# Patient Record
Sex: FEMALE | Race: OTHER | Employment: UNEMPLOYED | URBAN - METROPOLITAN AREA
[De-identification: names, ages, dates, MRNs, and addresses within clinical notes are randomized per-mention and may not be internally consistent; named-entity substitution may affect disease eponyms.]

---
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
## 2020-09-10 ENCOUNTER — OFFICE VISIT (OUTPATIENT)
Dept: PRIMARY CARE CLINIC | Age: 20
End: 2020-09-10
Payer: MEDICAID

## 2020-09-10 VITALS
WEIGHT: 179.8 LBS | HEIGHT: 67 IN | SYSTOLIC BLOOD PRESSURE: 128 MMHG | TEMPERATURE: 98.4 F | HEART RATE: 90 BPM | RESPIRATION RATE: 18 BRPM | DIASTOLIC BLOOD PRESSURE: 84 MMHG | OXYGEN SATURATION: 98 % | BODY MASS INDEX: 28.22 KG/M2

## 2020-09-10 DIAGNOSIS — Z00.00 WELL ADULT ON ROUTINE HEALTH CHECK: ICD-10-CM

## 2020-09-10 DIAGNOSIS — Z00.00 WELL ADULT ON ROUTINE HEALTH CHECK: Primary | ICD-10-CM

## 2020-09-10 DIAGNOSIS — E66.3 OVERWEIGHT: ICD-10-CM

## 2020-09-10 LAB
BASOPHILS # BLD: 0 K/UL (ref 0–0.1)
BASOPHILS NFR BLD: 1 % (ref 0–1)
DIFFERENTIAL METHOD BLD: NORMAL
EOSINOPHIL # BLD: 0.1 K/UL (ref 0–0.4)
EOSINOPHIL NFR BLD: 1 % (ref 0–7)
ERYTHROCYTE [DISTWIDTH] IN BLOOD BY AUTOMATED COUNT: 13.2 % (ref 11.5–14.5)
HCT VFR BLD AUTO: 36.4 % (ref 35–47)
HGB BLD-MCNC: 11.5 G/DL (ref 11.5–16)
IMM GRANULOCYTES # BLD AUTO: 0 K/UL (ref 0–0.04)
IMM GRANULOCYTES NFR BLD AUTO: 0 % (ref 0–0.5)
LYMPHOCYTES # BLD: 1.9 K/UL (ref 0.8–3.5)
LYMPHOCYTES NFR BLD: 29 % (ref 12–49)
MCH RBC QN AUTO: 28.2 PG (ref 26–34)
MCHC RBC AUTO-ENTMCNC: 31.6 G/DL (ref 30–36.5)
MCV RBC AUTO: 89.2 FL (ref 80–99)
MONOCYTES # BLD: 0.6 K/UL (ref 0–1)
MONOCYTES NFR BLD: 9 % (ref 5–13)
NEUTS SEG # BLD: 4 K/UL (ref 1.8–8)
NEUTS SEG NFR BLD: 60 % (ref 32–75)
NRBC # BLD: 0 K/UL (ref 0–0.01)
NRBC BLD-RTO: 0 PER 100 WBC
PLATELET # BLD AUTO: 274 K/UL (ref 150–400)
PMV BLD AUTO: 12.4 FL (ref 8.9–12.9)
RBC # BLD AUTO: 4.08 M/UL (ref 3.8–5.2)
WBC # BLD AUTO: 6.7 K/UL (ref 3.6–11)

## 2020-09-10 PROCEDURE — 99395 PREV VISIT EST AGE 18-39: CPT | Performed by: FAMILY MEDICINE

## 2020-09-10 NOTE — PATIENT INSTRUCTIONS
Well Visit, Ages 25 to 48: Care Instructions  Your Care Instructions     Physical exams can help you stay healthy. Your doctor has checked your overall health and may have suggested ways to take good care of yourself. He or she also may have recommended tests. At home, you can help prevent illness with healthy eating, regular exercise, and other steps. Follow-up care is a key part of your treatment and safety. Be sure to make and go to all appointments, and call your doctor if you are having problems. It's also a good idea to know your test results and keep a list of the medicines you take. How can you care for yourself at home? · Reach and stay at a healthy weight. This will lower your risk for many problems, such as obesity, diabetes, heart disease, and high blood pressure. · Get at least 30 minutes of physical activity on most days of the week. Walking is a good choice. You also may want to do other activities, such as running, swimming, cycling, or playing tennis or team sports. Discuss any changes in your exercise program with your doctor. · Do not smoke or allow others to smoke around you. If you need help quitting, talk to your doctor about stop-smoking programs and medicines. These can increase your chances of quitting for good. · Talk to your doctor about whether you have any risk factors for sexually transmitted infections (STIs). Having one sex partner (who does not have STIs and does not have sex with anyone else) is a good way to avoid these infections. · Use birth control if you do not want to have children at this time. Talk with your doctor about the choices available and what might be best for you. · Protect your skin from too much sun. When you're outdoors from 10 a.m. to 4 p.m., stay in the shade or cover up with clothing and a hat with a wide brim. Wear sunglasses that block UV rays. Even when it's cloudy, put broad-spectrum sunscreen (SPF 30 or higher) on any exposed skin.   · See a dentist one or two times a year for checkups and to have your teeth cleaned. · Wear a seat belt in the car. Follow your doctor's advice about when to have certain tests. These tests can spot problems early. For everyone  · Cholesterol. Have the fat (cholesterol) in your blood tested after age 21. Your doctor will tell you how often to have this done based on your age, family history, or other things that can increase your risk for heart disease. · Blood pressure. Have your blood pressure checked during a routine doctor visit. Your doctor will tell you how often to check your blood pressure based on your age, your blood pressure results, and other factors. · Vision. Talk with your doctor about how often to have a glaucoma test.  · Diabetes. Ask your doctor whether you should have tests for diabetes. · Colon cancer. Your risk for colorectal cancer gets higher as you get older. Some experts say that adults should start regular screening at age 48 and stop at age 76. Others say to start before age 48 or continue after age 76. Talk with your doctor about your risk and when to start and stop screening. For women  · Breast exam and mammogram. Talk to your doctor about when you should have a clinical breast exam and a mammogram. Medical experts differ on whether and how often women under 50 should have these tests. Your doctor can help you decide what is right for you. · Cervical cancer screening test and pelvic exam. Begin with a Pap test at age 24. The test often is part of a pelvic exam. Starting at age 27, you may choose to have a Pap test, an HPV test, or both tests at the same time (called co-testing). Talk with your doctor about how often to have testing. · Tests for sexually transmitted infections (STIs). Ask whether you should have tests for STIs. You may be at risk if you have sex with more than one person, especially if your partners do not wear condoms.   For men  · Tests for sexually transmitted infections (STIs). Ask whether you should have tests for STIs. You may be at risk if you have sex with more than one person, especially if you do not wear a condom. · Testicular cancer exam. Ask your doctor whether you should check your testicles regularly. · Prostate exam. Talk to your doctor about whether you should have a blood test (called a PSA test) for prostate cancer. Experts differ on whether and when men should have this test. Some experts suggest it if you are older than 39 and are -American or have a father or brother who got prostate cancer when he was younger than 72. When should you call for help? Watch closely for changes in your health, and be sure to contact your doctor if you have any problems or symptoms that concern you. Where can you learn more? Go to http://digna-chantal.info/  Enter P072 in the search box to learn more about \"Well Visit, Ages 25 to 48: Care Instructions. \"  Current as of: May 27, 2020               Content Version: 12.6  © 2006-2020 [employer]. Care instructions adapted under license by [employer] (which disclaims liability or warranty for this information). If you have questions about a medical condition or this instruction, always ask your healthcare professional. [place] [address] any warranty or liability for your use of this information.

## 2020-09-10 NOTE — PROGRESS NOTES
Subjective:   21 y.o. female for Well Woman Check. Her gyne and breast care is done elsewhere by her Ob-Gyne physician. She was following up with Dr. Barbi Oquendo for irregular period but due to insurance she was not able to follow up or started the OCP. She has a plan to follow up as soon as she has insurance which she applied recently. Now she has been having period about every 2 months. She exercise regularly but admits that her diet needs to be improved. She does feel anxious sometimes but no depression. Patient Active Problem List   Diagnosis Code    Overweight (BMI 25.0-29. 9) [id number]     Current Outpatient Medications   Medication Sig Dispense Refill    ibuprofen (ADVIL) 200 mg tablet Take 200 mg by mouth every six (6) hours as needed for Pain. No Known Allergies  History reviewed. No pertinent past medical history. Lab Results   Component Value Date/Time    WBC 3.6 06/27/2019 08:34 AM    HGB 12.0 06/27/2019 08:34 AM    HCT 37.4 06/27/2019 08:34 AM    PLATELET 099 43/70/2717 08:34 AM    MCV 86 06/27/2019 08:34 AM     No results found for: CHOL, CHOLPOCT, HDL, LDL, LDLC, LDLCPOC, LDLCEXT, TRIGL, TGLPOCT, CHHD, CHHDX  Lab Results   Component Value Date/Time    ALT (SGPT) 25 08/23/2019 02:15 PM    Alk.  phosphatase 70 08/23/2019 02:15 PM    Bilirubin, direct 0.07 08/23/2019 02:15 PM    Bilirubin, total <0.2 08/23/2019 02:15 PM    Albumin 4.6 08/23/2019 02:15 PM    Protein, total 7.5 08/23/2019 02:15 PM    PLATELET 693 22/96/7775 08:34 AM     Lab Results   Component Value Date/Time    GFR est non- 06/27/2019 08:34 AM    GFR est  06/27/2019 08:34 AM    Creatinine 0.76 06/27/2019 08:34 AM    BUN 19 06/27/2019 08:34 AM    Sodium 143 06/27/2019 08:34 AM    Potassium 4.7 06/27/2019 08:34 AM    Chloride 105 06/27/2019 08:34 AM    CO2 24 06/27/2019 08:34 AM     Lab Results   Component Value Date/Time    TSH 3.140 10/21/2019 03:02 PM      No results found for: HBA1C, OJF9PUBO, HGBE8, HAK8WWCT, VLP3VFGZ      ROS: Feeling generally well. No TIA's or unusual headaches, no dysphagia. No prolonged cough. No dyspnea or chest pain on exertion. No abdominal pain, change in bowel habits, black or bloody stools. No urinary tract symptoms. No new or unusual musculoskeletal symptoms. Specific concerns today: she would like to check hemoglobin level, blood sugar level as well as all other labs like kidney, liver, cholesterol. She is fasting for her blood work. Objective: The patient appears well, alert, oriented x 3, in no distress. Visit Vitals  /84   Pulse 90   Temp 98.4 °F (36.9 °C) (Temporal)   Resp 18   Ht 5' 7\" (1.702 m)   Wt 179 lb 12.8 oz (81.6 kg)   LMP 09/10/2020   SpO2 98%   BMI 28.16 kg/m²     ENT normal.  Neck supple. No adenopathy or thyromegaly. SILVINO. Lungs are clear, good air entry, no wheezes, rhonchi or rales. S1 and S2 normal, no murmurs, regular rate and rhythm. Abdomen soft without tenderness, guarding, mass or organomegaly. Extremities show no edema, normal peripheral pulses. Neurological is normal, no focal findings. Breast and Pelvic exams are deferred. Assessment/Plan:   Well Woman  lose weight, increase physical activity, follow low fat diet, follow low salt diet, continue present plan, routine labs ordered, call if any problems    ICD-10-CM ICD-9-CM    1. Well adult on routine health check  Z00.00 V70.0 CBC WITH AUTOMATED DIFF      HEMOGLOBIN A1C WITH EAG      METABOLIC PANEL, COMPREHENSIVE      LIPID PANEL      THYROID CASCADE PROFILE   2. Overweight  E66.3 278.02      Diagnoses and all orders for this visit:    1. Well adult on routine health check  -     CBC WITH AUTOMATED DIFF; Future  -     HEMOGLOBIN A1C WITH EAG; Future  -     METABOLIC PANEL, COMPREHENSIVE; Future  -     LIPID PANEL; Future  -     THYROID CASCADE PROFILE; Future              Follow up with us if she need any assistance on her period issues.    2. Overweight      Counseled on diet and exercise. Advised to obtain flu vaccination. She is not sure about that yet but she will consider. Follow-up and Dispositions    · Return if symptoms worsen or fail to improve.        current treatment plan is effective, no change in therapy  reviewed diet, exercise and weight control

## 2020-09-10 NOTE — PROGRESS NOTES
Chief Complaint   Patient presents with    Follow-up       1. Have you been to the ER, urgent care clinic since your last visit? Hospitalized since your last visit? no    2. Have you seen or consulted any other health care providers outside of the [address] [place] [address] since your last visit? Include any pap smears or colon screening.   no

## 2020-09-11 LAB
ALBUMIN SERPL-MCNC: 4 G/DL (ref 3.5–5)
ALBUMIN/GLOB SERPL: 1.1 {RATIO} (ref 1.1–2.2)
ALP SERPL-CCNC: 73 U/L (ref 45–117)
ALT SERPL-CCNC: 39 U/L (ref 12–78)
ANION GAP SERPL CALC-SCNC: 6 MMOL/L (ref 5–15)
AST SERPL-CCNC: 17 U/L (ref 15–37)
BILIRUB SERPL-MCNC: 0.3 MG/DL (ref 0.2–1)
BUN SERPL-MCNC: 16 MG/DL (ref 6–20)
BUN/CREAT SERPL: 23 (ref 12–20)
CALCIUM SERPL-MCNC: 9.4 MG/DL (ref 8.5–10.1)
CHLORIDE SERPL-SCNC: 106 MMOL/L (ref 97–108)
CHOLEST SERPL-MCNC: 149 MG/DL
CO2 SERPL-SCNC: 26 MMOL/L (ref 21–32)
CREAT SERPL-MCNC: 0.71 MG/DL (ref 0.55–1.02)
EST. AVERAGE GLUCOSE BLD GHB EST-MCNC: 97 MG/DL
GLOBULIN SER CALC-MCNC: 3.6 G/DL (ref 2–4)
GLUCOSE SERPL-MCNC: 89 MG/DL (ref 65–100)
HBA1C MFR BLD: 5 % (ref 4–5.6)
HDLC SERPL-MCNC: 47 MG/DL
HDLC SERPL: 3.2 {RATIO} (ref 0–5)
LDLC SERPL CALC-MCNC: 84.4 MG/DL (ref 0–100)
LIPID PROFILE,FLP: NORMAL
POTASSIUM SERPL-SCNC: 3.9 MMOL/L (ref 3.5–5.1)
PROT SERPL-MCNC: 7.6 G/DL (ref 6.4–8.2)
SODIUM SERPL-SCNC: 138 MMOL/L (ref 136–145)
TRIGL SERPL-MCNC: 88 MG/DL (ref ?–150)
TSH SERPL-ACNC: 3.05 UIU/ML (ref 0.45–4.5)
VLDLC SERPL CALC-MCNC: 17.6 MG/DL

## 2020-09-11 NOTE — PROGRESS NOTES
Sent via my chart. June Hans! Your lab results came back good! Nothing concerning. Blood sugar level, thyroid level is in normal range. Thanks.     Dr. Rafe Hashimoto

## 2021-03-10 NOTE — PROGRESS NOTES
HPI:     Chief Complaint   Patient presents with    Immunization/Injection     Not here to [facility], only needs flu shot        Patient is a 25 y.o. female who presents as new patient for flu shot. She has no significant medical history and takes not chronic medications. Patient does report history of low back issues and states she has a small herniated disc. She is seeing ortho for this and is in PT. She takes naproxen as needed. She is not in any pain today. Denies any LE weakness, numbness, paresthesias, loss of bowel/bladder control, saddle anesthesia. Patient feels well today and has no concerns. She is here only for flu shot. Patient reports she had physical in August 2018 with her PCP. Patient denies fever, chills, dizziness, headache, fatigue, syncope, chest pain, palpitations, dyspnea, abdominal pain, change in appetite, nausea, vomiting, constipation, and diarrhea. Patient Active Problem List   Diagnosis Code    Overweight (BMI 25.0-29. 9) E66.3     Current Outpatient Medications   Medication Sig Dispense Refill    naproxen (NAPROSYN) 375 mg tablet Take 375 mg by mouth two (2) times daily (with meals). No Known Allergies     History reviewed. No pertinent past medical history. Past Surgical History:   Procedure Laterality Date    HX WISDOM TEETH EXTRACTION       Family History   Problem Relation Age of Onset    No Known Problems Mother     Hypertension Father     Diabetes Maternal Grandmother     Hypertension Maternal Grandmother     Cancer Maternal Grandfather         liver cancer    Cancer Paternal Grandmother     Heart Attack Paternal Grandfather     Heart Disease Paternal Grandfather      Social History     Tobacco Use    Smoking status: Never Smoker    Smokeless tobacco: Never Used   Substance Use Topics    Alcohol use: No     Frequency: Never          ROS:   Pertinent items are noted in HPI.       Objective:     Vitals:    11/23/18 1024   BP: 120/77   Pulse: 77 Resp: 16   Temp: 98.5 °F (36.9 °C)   TempSrc: Oral   SpO2: 99%   Weight: 165 lb 9.6 oz (75.1 kg)   Height: 5' 7\" (1.702 m)        Vitals and Nurse Documentation reviewed. Physical Examination:   General appearance - alert, well appearing, and in no distress  Mental status - alert, oriented to person, place, and time, normal mood, behavior, speech, dress, motor activity, and thought processes  Eyes - pupils equal and reactive, extraocular eye movements intact, sclera white, conjunctiva pink  Ears - bilateral TM's and external ear canals normal  Nose - normal and patent, no erythema, discharge or polyps  Mouth - mucous membranes moist, pharynx normal without lesions  Neck - supple, no significant adenopathy  Chest - clear to auscultation, no wheezes, rales or rhonchi, symmetric air entry  Heart - normal rate, regular rhythm, normal S1, S2, no murmurs, rubs, clicks or gallops  Abdomen - soft, nontender, nondistended, no masses or organomegaly  Neurological - alert, oriented, normal speech, no focal findings or movement disorder noted  Extremities - peripheral pulses normal, no pedal edema, no clubbing or cyanosis      Assessment/ Plan:   Diagnoses and all orders for this visit:    1. Encounter for immunization  -     Patient seen today for flu shot. No contraindications to receiving flu shot today. -     INFLUENZA VIRUS VAC QUAD,SPLIT,PRESV FREE SYRINGE IM    2. Overweight (BMI 25.0-29.9)        -     BMI discussed with patient. Discussed lifestyle changes, daily physical activity, and advised 150 minutes of exercise weekly. Discussed healthy diet choices and limiting fried, fatty foods, fast foods, processed foods, sugar-sweetened beverages/soda, and added sugars. Increase fruits, vegetables, low-fat dairy products, lean proteins, and whole grains. Patient expressed interest in the HPV vaccine during today's visit. She does not want to start series today, but would like more information.   The HPV virus was discussed and the vaccine was highly recommended. Patient was given educational handout to read more about vaccine. Patient was told that she can follow-up with her PCP for vaccine or come back here to start the series/establish care if she likes. Follow-up Disposition:  Return if symptoms worsen or fail to improve. I have discussed the diagnosis with the patient and the intended plan as seen in the above orders. Advised prompt follow-up if symptoms worsen or fail to improve and symptoms that would warrant emergent evaluation in ED. The patient has received an after-visit summary and questions were answered concerning future plans. I have discussed medication side effects and warnings with the patient as well. Patient expressed understanding and is in agreement with the diagnosis and plan. Transposition Flap Text: The defect edges were debeveled with a #15 scalpel blade.  Given the location of the defect and the proximity to free margins a transposition flap was deemed most appropriate.  Using a sterile surgical marker, an appropriate transposition flap was drawn incorporating the defect.    The area thus outlined was incised deep to adipose tissue with a #15 scalpel blade.  The skin margins were undermined to an appropriate distance in all directions utilizing iris scissors.

## 2021-10-05 ENCOUNTER — OFFICE VISIT (OUTPATIENT)
Dept: PRIMARY CARE CLINIC | Age: 21
End: 2021-10-05
Payer: COMMERCIAL

## 2021-10-05 VITALS
WEIGHT: 156 LBS | OXYGEN SATURATION: 100 % | TEMPERATURE: 98.2 F | HEIGHT: 67 IN | BODY MASS INDEX: 24.48 KG/M2 | HEART RATE: 70 BPM | RESPIRATION RATE: 16 BRPM | SYSTOLIC BLOOD PRESSURE: 119 MMHG | DIASTOLIC BLOOD PRESSURE: 76 MMHG

## 2021-10-05 DIAGNOSIS — F41.9 ANXIETY: ICD-10-CM

## 2021-10-05 DIAGNOSIS — Z00.00 WELL ADULT ON ROUTINE HEALTH CHECK: Primary | ICD-10-CM

## 2021-10-05 PROCEDURE — 99395 PREV VISIT EST AGE 18-39: CPT | Performed by: FAMILY MEDICINE

## 2021-10-05 PROCEDURE — 99212 OFFICE O/P EST SF 10 MIN: CPT | Performed by: FAMILY MEDICINE

## 2021-10-05 RX ORDER — SERTRALINE HYDROCHLORIDE 50 MG/1
TABLET, FILM COATED ORAL
Qty: 30 TABLET | Refills: 1 | Status: SHIPPED | OUTPATIENT
Start: 2021-10-05 | End: 2022-02-09

## 2021-10-05 NOTE — PROGRESS NOTES
Subjective:   24 y.o. female is here with a request to get blood work and physical. She is here to make sure hemoglobin level is normal.     Depression screen is positive today. Denies any SI, HI. She reports that she does not think she is depressed but she feels anxious all the time. Reports that she has seen counselor in the past. She think its time to start medication for anxiety. Her gyne and breast care is done elsewhere by her Ob-Gyne physician. Patient Active Problem List   Diagnosis Code    Overweight (BMI 25.0-29. 9) E66.3     Current Outpatient Medications   Medication Sig Dispense Refill    sertraline (ZOLOFT) 50 mg tablet Take 1/2 tab daily for 2 weeks followed by one tab PO daily afterwards. 30 Tablet 1    ibuprofen (ADVIL) 200 mg tablet Take 200 mg by mouth every six (6) hours as needed for Pain. No Known Allergies  History reviewed. No pertinent past medical history. Past Surgical History:   Procedure Laterality Date    HX WISDOM TEETH EXTRACTION          Lab Results   Component Value Date/Time    WBC 6.7 09/10/2020 10:06 AM    HGB 11.5 09/10/2020 10:06 AM    HCT 36.4 09/10/2020 10:06 AM    PLATELET 975 72/29/4850 10:06 AM    MCV 89.2 09/10/2020 10:06 AM     Lab Results   Component Value Date/Time    Cholesterol, total 149 09/10/2020 10:06 AM    HDL Cholesterol 47 09/10/2020 10:06 AM    LDL, calculated 84.4 09/10/2020 10:06 AM    Triglyceride 88 09/10/2020 10:06 AM    CHOL/HDL Ratio 3.2 09/10/2020 10:06 AM     Lab Results   Component Value Date/Time    GFR est non-AA >60 09/10/2020 10:06 AM    GFR est AA >60 09/10/2020 10:06 AM    Creatinine 0.71 09/10/2020 10:06 AM    BUN 16 09/10/2020 10:06 AM    Sodium 138 09/10/2020 10:06 AM    Potassium 3.9 09/10/2020 10:06 AM    Chloride 106 09/10/2020 10:06 AM    CO2 26 09/10/2020 10:06 AM     Lab Results   Component Value Date/Time    TSH 3.050 09/10/2020 10:06 AM    TSH 3.140 10/21/2019 03:02 PM         ROS: Feeling generally well.  No TIA's or unusual headaches, no dysphagia. No prolonged cough. No dyspnea or chest pain on exertion. No abdominal pain, change in bowel habits, black or bloody stools. No urinary tract symptoms. No new or unusual musculoskeletal symptoms. Specific concerns today: refer to HPI. .    Objective: The patient appears well, alert, oriented x 3, in no distress. Visit Vitals  /76   Pulse 70   Temp 98.2 °F (36.8 °C) (Oral)   Resp 16   Ht 5' 7\" (1.702 m)   Wt 156 lb (70.8 kg)   SpO2 100%   BMI 24.43 kg/m²     ENT normal.  Neck supple. No adenopathy or thyromegaly. SILVINO. Lungs are clear, good air entry, no wheezes, rhonchi or rales. S1 and S2 normal, no murmurs, regular rate and rhythm. Abdomen soft without tenderness, guarding, mass or organomegaly. Extremities show no edema, normal peripheral pulses. Neurological is normal, no focal findings. Breast and Pelvic exams are deferred. Assessment/Plan:   Well Woman  follow low fat diet, follow low salt diet, continue present plan, routine labs ordered, call if any problems    ICD-10-CM ICD-9-CM    1. Well adult on routine health check  R57.53 U62.5 METABOLIC PANEL, COMPREHENSIVE      LIPID PANEL      CBC WITH AUTOMATED DIFF      THYROID CASCADE PROFILE   2. Anxiety  F41.9 300.00 sertraline (ZOLOFT) 50 mg tablet      THYROID CASCADE PROFILE     Diagnoses and all orders for this visit:    1. Well adult on routine health check  -     METABOLIC PANEL, COMPREHENSIVE; Future  -     LIPID PANEL; Future  -     CBC WITH AUTOMATED DIFF; Future  -     THYROID CASCADE PROFILE; Future    2. Anxiety  -    After discussing different options agreed to try sertraline (ZOLOFT) 50 mg tablet; Take 1/2 tab daily for 2 weeks followed by one tab PO daily afterwards.  -     THYROID CASCADE PROFILE; Future      Follow-up and Dispositions    · Return in about 2 months (around 12/5/2021), or if symptoms worsen or fail to improve, for anxiety.        reviewed diet, exercise and weight control  reviewed medications and side effects in detail.

## 2021-10-05 NOTE — PROGRESS NOTES
Nury Thomas is a 24 y.o. female  Chief Complaint   Patient presents with    Follow-up     Health Maintenance Due   Topic Date Due    Hepatitis C Screening  Never done    COVID-19 Vaccine (1) Never done    Pap Smear  Never done    Flu Vaccine (1) 09/01/2021     Visit Vitals  /76   Pulse 70   Temp 98.2 °F (36.8 °C) (Oral)   Resp 16   Ht 5' 7\" (1.702 m)   Wt 156 lb (70.8 kg)   SpO2 100%   BMI 24.43 kg/m²

## 2021-10-19 ENCOUNTER — OFFICE VISIT (OUTPATIENT)
Dept: PRIMARY CARE CLINIC | Age: 21
End: 2021-10-19
Payer: COMMERCIAL

## 2021-10-19 VITALS
WEIGHT: 158 LBS | BODY MASS INDEX: 24.8 KG/M2 | HEART RATE: 84 BPM | DIASTOLIC BLOOD PRESSURE: 79 MMHG | OXYGEN SATURATION: 98 % | HEIGHT: 67 IN | RESPIRATION RATE: 16 BRPM | TEMPERATURE: 98.4 F | SYSTOLIC BLOOD PRESSURE: 118 MMHG

## 2021-10-19 DIAGNOSIS — Z23 ENCOUNTER FOR IMMUNIZATION: ICD-10-CM

## 2021-10-19 DIAGNOSIS — D64.9 LOW HEMOGLOBIN: Primary | ICD-10-CM

## 2021-10-19 PROCEDURE — 90686 IIV4 VACC NO PRSV 0.5 ML IM: CPT | Performed by: FAMILY MEDICINE

## 2021-10-19 PROCEDURE — 99213 OFFICE O/P EST LOW 20 MIN: CPT | Performed by: FAMILY MEDICINE

## 2021-10-19 RX ORDER — FERROUS SULFATE 325(65) MG
325 TABLET, DELAYED RELEASE (ENTERIC COATED) ORAL 2 TIMES DAILY WITH MEALS
Qty: 60 TABLET | Refills: 3 | Status: SHIPPED | OUTPATIENT
Start: 2021-10-19

## 2021-10-19 NOTE — PROGRESS NOTES
Identified pt with two pt identifiers(name and ). No chief complaint on file. 3 most recent PHQ Screens 10/5/2021   Little interest or pleasure in doing things More than half the days   Feeling down, depressed, irritable, or hopeless More than half the days   Total Score PHQ 2 4   Trouble falling or staying asleep, or sleeping too much Not at all   Feeling tired or having little energy More than half the days   Poor appetite, weight loss, or overeating Not at all   Feeling bad about yourself - or that you are a failure or have let yourself or your family down Nearly every day   Trouble concentrating on things such as school, work, reading, or watching TV Nearly every day   Moving or speaking so slowly that other people could have noticed; or the opposite being so fidgety that others notice Not at all   Thoughts of being better off dead, or hurting yourself in some way Not at all   PHQ 9 Score 12   How difficult have these problems made it for you to do your work, take care of your home and get along with others Somewhat difficult        There were no vitals filed for this visit. Health Maintenance Due   Topic    Hepatitis C Screening     COVID-19 Vaccine (1)    Pap Smear        1. Have you been to the ER, urgent care clinic since your last visit? Hospitalized since your last visit?no    2. Have you seen or consulted any other health care providers outside of the [address] [place] [address] since your last visit? Include any pap smears or colon screening.  No

## 2021-10-19 NOTE — PROGRESS NOTES
Subjective:     Chief Complaint   Patient presents with    Results        She  is a 24y.o. year old female who presents today to discuss lab results. Her recent lab showed low hemoglobin otherwise lab looks fine. No hx of low Hgb. Sometimes she feels dizzy and HA otherwise doing well. About two weeks ago she was started on Zoloft for anxiety and depression. She reports that 25 mg of Zoloft is working well for her. She would like to continue 25 mg. Lab Results   Component Value Date/Time    WBC 6.0 10/07/2021 07:15 AM    HGB 10.3 (L) 10/07/2021 07:15 AM    HCT 33.7 (L) 10/07/2021 07:15 AM    PLATELET 203 86/88/3399 07:15 AM    MCV 89.9 10/07/2021 07:15 AM     Lab Results   Component Value Date/Time    Cholesterol, total 150 10/07/2021 07:15 AM    HDL Cholesterol 58 10/07/2021 07:15 AM    LDL, calculated 76 10/07/2021 07:15 AM    Triglyceride 80 10/07/2021 07:15 AM    CHOL/HDL Ratio 2.6 10/07/2021 07:15 AM     Lab Results   Component Value Date/Time    ALT (SGPT) 23 10/07/2021 07:15 AM    Alk. phosphatase 80 10/07/2021 07:15 AM    Bilirubin, direct 0.07 08/23/2019 02:15 PM    Bilirubin, total 0.4 10/07/2021 07:15 AM    Albumin 3.7 10/07/2021 07:15 AM    Protein, total 7.1 10/07/2021 07:15 AM    PLATELET 733 26/15/8492 07:15 AM     Lab Results   Component Value Date/Time    GFR est non-AA >60 10/07/2021 07:15 AM    GFR est AA >60 10/07/2021 07:15 AM    Creatinine 0.78 10/07/2021 07:15 AM    BUN 19 10/07/2021 07:15 AM    Sodium 140 10/07/2021 07:15 AM    Potassium 4.7 10/07/2021 07:15 AM    Chloride 109 (H) 10/07/2021 07:15 AM    CO2 26 10/07/2021 07:15 AM     Lab Results   Component Value Date/Time    TSH 1.970 10/07/2021 07:15 AM    TSH 3.140 10/21/2019 03:02 PM          Pertinent items are noted in HPI.   Objective:     Vitals:    10/19/21 1556   BP: 118/79   Pulse: 84   Resp: 16   Temp: 98.4 °F (36.9 °C)   TempSrc: Temporal   SpO2: 98%   Weight: 158 lb (71.7 kg)   Height: 5' 7\" (1.702 m)       Physical Examination: General appearance - alert, well appearing, and in no distress, oriented to person, place, and time and normal appearing weight  Mental status - alert, oriented to person, place, and time, normal mood, behavior, speech, dress, motor activity, and thought processes  Chest - clear to auscultation, no wheezes, rales or rhonchi, symmetric air entry  Heart - normal rate, regular rhythm, normal S1, S2, no murmurs, rubs, clicks or gallops    No Known Allergies   Social History     Socioeconomic History    Marital status: SINGLE     Spouse name: Not on file    Number of children: Not on file    Years of education: Not on file    Highest education level: Not on file   Tobacco Use    Smoking status: Never Smoker    Smokeless tobacco: Never Used   Vaping Use    Vaping Use: Never used   Substance and Sexual Activity    Alcohol use: No    Drug use: No    Sexual activity: Never   Social History Narrative    ** Merged History Encounter **          Social Determinants of Health     Financial Resource Strain:     Difficulty of Paying Living Expenses:    Food Insecurity:     Worried About Running Out of Food in the Last Year:     Ran Out of Food in the Last Year:    Transportation Needs:     Lack of Transportation (Medical):      Lack of Transportation (Non-Medical):    Physical Activity:     Days of Exercise per Week:     Minutes of Exercise per Session:    Stress:     Feeling of Stress :    Social Connections:     Frequency of Communication with Friends and Family:     Frequency of Social Gatherings with Friends and Family:     Attends Restoration Services:     Active Member of Clubs or Organizations:     Attends Club or Organization Meetings:     Marital Status:       Family History   Problem Relation Age of Onset    No Known Problems Mother     Hypertension Father     Elevated Lipids Father     Diabetes Maternal Grandmother     Hypertension Maternal Grandmother     Cancer Maternal Grandfather         liver cancer    Cancer Paternal Grandmother         unknown    Heart Attack Paternal Grandfather     Heart Disease Paternal Grandfather     Breast Cancer Paternal Aunt       Past Surgical History:   Procedure Laterality Date    HX WISDOM TEETH EXTRACTION        History reviewed. No pertinent past medical history. Current Outpatient Medications   Medication Sig Dispense Refill    sertraline (ZOLOFT) 50 mg tablet Take 1/2 tab daily for 2 weeks followed by one tab PO daily afterwards. 30 Tablet 1    ibuprofen (ADVIL) 200 mg tablet Take 200 mg by mouth every six (6) hours as needed for Pain. Assessment/ Plan:   Diagnoses and all orders for this visit:    1. Low hemoglobin  -    Start  ferrous sulfate (IRON) 325 mg (65 mg iron) EC tablet; Take 1 Tablet by mouth two (2) times daily (with meals). Encouraged to increase iron rich diet. 2. Encounter for immunization  -     INFLUENZA VIRUS VAC QUAD,SPLIT,PRESV FREE SYRINGE IM           Medication risks/benefits/costs/interactions/alternatives discussed with patient. Advised patient to call back or return to office if symptoms worsen/change/persist. If patient cannot reach us or should anything more severe/urgent arise he/she should proceed directly to the nearest emergency department. Discussed expected course/resolution/complications of diagnosis in detail with patient. Patient given a written after visit summary which includes her diagnoses, current medications and vitals. Patient expressed understanding with the diagnosis and plan. Follow-up and Dispositions    · Return in about 2 months (around 12/19/2021) for anxiety.

## 2022-01-05 NOTE — PATIENT INSTRUCTIONS
Well Visit, Ages 25 to 48: Care Instructions  Overview     Well visits can help you stay healthy. Your doctor has checked your overall health and may have suggested ways to take good care of yourself. Your doctor also may have recommended tests. At home, you can help prevent illness with healthy eating, regular exercise, and other steps. Follow-up care is a key part of your treatment and safety. Be sure to make and go to all appointments, and call your doctor if you are having problems. It's also a good idea to know your test results and keep a list of the medicines you take. How can you care for yourself at home? · Get screening tests that you and your doctor decide on. Screening helps find diseases before any symptoms appear. · Eat healthy foods. Choose fruits, vegetables, whole grains, protein, and low-fat dairy foods. Limit fat, especially saturated fat. Reduce salt in your diet. · Limit alcohol. If you are a man, have no more than 2 drinks a day or 14 drinks a week. If you are a woman, have no more than 1 drink a day or 7 drinks a week. · Get at least 30 minutes of physical activity on most days of the week. Walking is a good choice. You also may want to do other activities, such as running, swimming, cycling, or playing tennis or team sports. Discuss any changes in your exercise program with your doctor. · Reach and stay at a healthy weight. This will lower your risk for many problems, such as obesity, diabetes, heart disease, and high blood pressure. · Do not smoke or allow others to smoke around you. If you need help quitting, talk to your doctor about stop-smoking programs and medicines. These can increase your chances of quitting for good. · Care for your mental health. It is easy to get weighed down by worry and stress. Learn strategies to manage stress, like deep breathing and mindfulness, and stay connected with your family and community.  If you find you often feel sad or hopeless, talk with your doctor. Treatment can help. · Talk to your doctor about whether you have any risk factors for sexually transmitted infections (STIs). You can help prevent STIs if you wait to have sex with a new partner (or partners) until you've each been tested for STIs. It also helps if you use condoms (male or female condoms) and if you limit your sex partners to one person who only has sex with you. Vaccines are available for some STIs, such as HPV. · Use birth control if it's important to you to prevent pregnancy. Talk with your doctor about the choices available and what might be best for you. · If you think you may have a problem with alcohol or drug use, talk to your doctor. This includes prescription medicines (such as amphetamines and opioids) and illegal drugs (such as cocaine and methamphetamine). Your doctor can help you figure out what type of treatment is best for you. · Protect your skin from too much sun. When you're outdoors from 10 a.m. to 4 p.m., stay in the shade or cover up with clothing and a hat with a wide brim. Wear sunglasses that block UV rays. Even when it's cloudy, put broad-spectrum sunscreen (SPF 30 or higher) on any exposed skin. · See a dentist one or two times a year for checkups and to have your teeth cleaned. · Wear a seat belt in the car. When should you call for help? Watch closely for changes in your health, and be sure to contact your doctor if you have any problems or symptoms that concern you. Where can you learn more? Go to http://www.[employer].com/  Enter P072 in the search box to learn more about \"Well Visit, Ages 25 to 48: Care Instructions. \"  Current as of: February 11, 2021               Content Version: 13.0  © 5992-1586 [employer]. Care instructions adapted under license by [employer] (which disclaims liability or warranty for this information).  If you have questions about a medical condition or this instruction, always ask your healthcare professional. [place] [address] any warranty or liability for your use of this information.

## 2022-01-06 ENCOUNTER — OFFICE VISIT (OUTPATIENT)
Dept: OBGYN CLINIC | Age: 22
End: 2022-01-06
Payer: COMMERCIAL

## 2022-01-06 VITALS
WEIGHT: 154 LBS | BODY MASS INDEX: 24.17 KG/M2 | HEIGHT: 67 IN | SYSTOLIC BLOOD PRESSURE: 130 MMHG | DIASTOLIC BLOOD PRESSURE: 82 MMHG

## 2022-01-06 DIAGNOSIS — N92.6 IRREGULAR MENSES: ICD-10-CM

## 2022-01-06 DIAGNOSIS — Z01.419 ENCOUNTER FOR GYNECOLOGICAL EXAMINATION WITHOUT ABNORMAL FINDING: Primary | ICD-10-CM

## 2022-01-06 PROCEDURE — 99385 PREV VISIT NEW AGE 18-39: CPT | Performed by: OBSTETRICS & GYNECOLOGY

## 2022-01-06 RX ORDER — NORETHINDRONE ACETATE AND ETHINYL ESTRADIOL 1MG-20(21)
1 KIT ORAL DAILY
Qty: 3 DOSE PACK | Refills: 3 | Status: SHIPPED | OUTPATIENT
Start: 2022-01-06 | End: 2022-02-03

## 2022-01-06 NOTE — PROGRESS NOTES
Annual exam    Henna Santana is a 24 y.o. G0 presenting for annual exam.     Pt with long history of irregular menses, often skips menses for 2-3 months. Last cycle was in November. She also notes some issues with acne, denies significant hair growth. Has never been worked up for PCOS, but reports prior thyroid testing has been normal. Interested in trial of OCPs to regulate menses. Also notes increased vaginal discharge, but denies odor or irritation. She states she has been sexually active but not had intercourse yet. She is due for her first pelvic exam and pap today. She has had difficulty inserting tampons in the past, thinks some difficulty relaxing pelvic muscles. Pt is senior at [employer], biology major, but wants to do photography after graduating. 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 Ob/Gyn Hx:  G0   LMP- November  Menarche- 10  Menses- irregular, long cycles  Contraception-abstinence  STI- denies  ? SA-never     Health maintenance:  Pap- first today  Gardasil-3/3    No past medical history on file.     Past Surgical History:   Procedure Laterality Date    HX WISDOM TEETH EXTRACTION         Family History   Problem Relation Age of Onset    No Known Problems Mother     Hypertension Father     Elevated Lipids Father     Diabetes Maternal Grandmother     Hypertension Maternal Grandmother     Cancer Maternal Grandfather         liver cancer    Cancer Paternal Grandmother         unknown    Heart Attack Paternal Grandfather     Heart Disease Paternal Grandfather     Breast Cancer Paternal Aunt        Social History     Socioeconomic History    Marital status: SINGLE     Spouse name: Not on file    Number of children: Not on file    Years of education: Not on file    Highest education level: Not on file   Occupational History    Not on file   Tobacco Use    Smoking status: Never Smoker    Smokeless tobacco: Never Used   Vaping Use    Vaping Use: Never used   Substance and Sexual Activity  Alcohol use: No    Drug use: No    Sexual activity: Never   Other Topics Concern    Not on file   Social History Narrative    ** Merged History Encounter **          Social Determinants of Health     Financial Resource Strain:     Difficulty of Paying Living Expenses: Not on file   Food Insecurity:     Worried About Running Out of Food in the Last Year: Not on file    Karie of Food in the Last Year: Not on file   Transportation Needs:     Lack of Transportation (Medical): Not on file    Lack of Transportation (Non-Medical): Not on file   Physical Activity:     Days of Exercise per Week: Not on file    Minutes of Exercise per Session: Not on file   Stress:     Feeling of Stress : Not on file   Social Connections:     Frequency of Communication with Friends and Family: Not on file    Frequency of Social Gatherings with Friends and Family: Not on file    Attends Faith Services: Not on file    Active Member of [address] [place] [address] or Organizations: Not on file    Attends Club or Organization Meetings: Not on file    Marital Status: Not on file   Intimate Partner Violence:     Fear of Current or Ex-Partner: Not on file    Emotionally Abused: Not on file    Physically Abused: Not on file    Sexually Abused: Not on file   Housing Stability:     Unable to Pay for Housing in the Last Year: Not on file    Number of [place] in the Last Year: Not on file    Unstable Housing in the Last Year: Not on file       Current Outpatient Medications   Medication Sig Dispense Refill    ferrous sulfate (IRON) 325 mg (65 mg iron) EC tablet Take 1 Tablet by mouth two (2) times daily (with meals). 60 Tablet 3    sertraline (ZOLOFT) 50 mg tablet Take 1/2 tab daily for 2 weeks followed by one tab PO daily afterwards. 30 Tablet 1    ibuprofen (ADVIL) 200 mg tablet Take 200 mg by mouth every six (6) hours as needed for Pain.          No Known Allergies    Review of Systems - History obtained from the patient  Constitutional: negative for weight loss, fever, night sweats  HEENT: negative for hearing loss, earache, congestion, snoring, sorethroat  CV: negative for chest pain, palpitations, edema  Resp: negative for cough, shortness of breath, wheezing  GI: negative for change in bowel habits, abdominal pain, black or bloody stools  : negative for frequency, dysuria, hematuria, vaginal discharge, +irregular menses  MSK: negative for back pain, joint pain, muscle pain  Breast: negative for breast lumps, nipple discharge, galactorrhea  Skin :negative for itching, rash, hives, +acne  Neuro: negative for dizziness, headache, confusion, weakness  Psych: negative for anxiety, depression, change in mood  Heme/lymph: negative for bleeding, bruising, pallor    Physical Exam  Visit Vitals  /82 (BP 1 Location: Left arm, BP Patient Position: Sitting)   Ht 5' 7\" (1.702 m)   Wt 154 lb (69.9 kg)   BMI 24.12 kg/m²       Constitutional  · Appearance: well-nourished, well developed, alert, in no acute distress    HENT  · Head and Face: appears normal    Neck  · Inspection/Palpation: normal appearance, no masses or tenderness  · Lymph Nodes: no lymphadenopathy present  · Thyroid: gland size normal, nontender, no nodules or masses present on palpation    Chest  · Respiratory Effort: non-labored breathing  · Auscultation: CTAB, normal breath sounds    Cardiovascular  · Heart:  · Auscultation: regular rate and rhythm without murmur  · Extremities: no peripheral edema    Breasts  · Inspection of Breasts: breasts symmetrical, no skin changes, no discharge present, nipple appearance normal, no skin retraction present  · Palpation of Breasts and Axillae: no masses present on palpation, no breast tenderness  · Axillary Lymph Nodes: no lymphadenopathy present    Gastrointestinal  · Abdominal Examination: abdomen non-tender to palpation, normal bowel sounds, no masses present  · Liver and spleen: no hepatomegaly present, spleen not palpable  · Hernias: no hernias identified    Genitourinary  · External Genitalia: normal appearance for age, no discharge present, no tenderness present, no inflammatory lesions present, no masses present, no atrophy present  · Vagina: normal vaginal vault without central or paravaginal defects, no discharge present, no inflammatory lesions present, no masses present, normal appearing, but somewhat narrow hymenal opening, but able to accommodate small speculum and bimanual exam, overall normal hymenal anatomy, perhaps just slightly annular  · Bladder: non-tender to palpation  · Urethra: appears normal  · Cervix: normal   · Uterus: normal size, shape and consistency  · Adnexa: no adnexal tenderness present, no adnexal masses present  · Perineum: perineum within normal limits, no evidence of trauma, no rashes or skin lesions present    Skin  · General Inspection: no rash, no lesions identified    Neurologic/Psychiatric  · Mental Status:  · Orientation: grossly oriented to person, place and time  · Mood and Affect: mood normal, affect appropriate      Assessment/Plan:  24 y.o. G0 presenting for annual exam. Overall doing well. +AUB/irregular menses - ? PCOS.     Health Maintenance:  -diet, exercise, healthy lifestyle  -pap today  -STI screening declined  -Gardasil completed    AUB:  -reviewed hormonal contraceptive options for menstrual control, pt interested in trial of OCPs  -Rx for Junel provided  -PCOS labs and TSH today  -consider pelvic US in future to evaluate for polycystic ovaries, benign exam today    RTC: 1 year for AE or sooner marv London MD  1/6/2022  10:12 AM

## 2022-01-07 LAB
COMMENT, HOLDF: NORMAL
CYTOLOGIST CVX/VAG CYTO: NORMAL
CYTOLOGY CVX/VAG DOC CYTO: NORMAL
CYTOLOGY CVX/VAG DOC THIN PREP: NORMAL
DX ICD CODE: NORMAL
EST. AVERAGE GLUCOSE BLD GHB EST-MCNC: 103 MG/DL
HBA1C MFR BLD: 5.2 % (ref 4–5.6)
LABCORP, 190119: NORMAL
Lab: NORMAL
OTHER STN SPEC: NORMAL
PROLACTIN SERPL-MCNC: 17.5 NG/ML
SAMPLES BEING HELD,HOLD: NORMAL
STAT OF ADQ CVX/VAG CYTO-IMP: NORMAL
T4 FREE SERPL-MCNC: 1 NG/DL (ref 0.8–1.5)
TSH SERPL DL<=0.05 MIU/L-ACNC: 1.53 UIU/ML (ref 0.36–3.74)

## 2022-01-08 LAB — DHEA-S SERPL-MCNC: 191 UG/DL (ref 110–431.7)

## 2022-01-10 LAB
17OHP SERPL-MCNC: 125 NG/DL
TESTOST FREE SERPL-MCNC: 3.1 PG/ML (ref 0–4.2)
TESTOST SERPL-MCNC: 33.6 NG/DL (ref 10–55)

## 2022-02-09 ENCOUNTER — OFFICE VISIT (OUTPATIENT)
Dept: PRIMARY CARE CLINIC | Age: 22
End: 2022-02-09
Payer: COMMERCIAL

## 2022-02-09 VITALS
OXYGEN SATURATION: 98 % | HEART RATE: 78 BPM | TEMPERATURE: 97.8 F | BODY MASS INDEX: 23.7 KG/M2 | WEIGHT: 151 LBS | SYSTOLIC BLOOD PRESSURE: 120 MMHG | HEIGHT: 67 IN | DIASTOLIC BLOOD PRESSURE: 82 MMHG | RESPIRATION RATE: 16 BRPM

## 2022-02-09 DIAGNOSIS — D64.9 LOW HEMOGLOBIN: Primary | ICD-10-CM

## 2022-02-09 DIAGNOSIS — F41.9 ANXIETY: ICD-10-CM

## 2022-02-09 PROCEDURE — 99213 OFFICE O/P EST LOW 20 MIN: CPT | Performed by: FAMILY MEDICINE

## 2022-02-09 NOTE — PROGRESS NOTES
Identified pt with two pt identifiers(name and ). No chief complaint on file. 3 most recent PHQ Screens 10/19/2021   Little interest or pleasure in doing things Not at all   Feeling down, depressed, irritable, or hopeless Not at all   Total Score PHQ 2 0   Trouble falling or staying asleep, or sleeping too much -   Feeling tired or having little energy -   Poor appetite, weight loss, or overeating -   Feeling bad about yourself - or that you are a failure or have let yourself or your family down -   Trouble concentrating on things such as school, work, reading, or watching TV -   Moving or speaking so slowly that other people could have noticed; or the opposite being so fidgety that others notice -   Thoughts of being better off dead, or hurting yourself in some way -   PHQ 9 Score -   How difficult have these problems made it for you to do your work, take care of your home and get along with others -        There were no vitals filed for this visit. Health Maintenance Due   Topic    Hepatitis C Screening     COVID-19 Vaccine (3 - Booster for Moderna series)       1. Have you been to the ER, urgent care clinic since your last visit? Hospitalized since your last visit? No    2. Have you seen or consulted any other health care providers outside of the [address] [place] [address] since your last visit? Include any pap smears or colon screening.  No

## 2022-02-09 NOTE — PROGRESS NOTES
Subjective:     Chief Complaint   Patient presents with   Caio Raza Jonathon        She  is a 24y.o. year old female who presents today for follow up on hemoglobin level. She is taking iron supplement once/day. Dizziness has been improved after she started taking iron supplement. Stopped taking Zoloft about two months ago. Has been doing well. She will be starting to see a counselor in [address] [facility]. Pertinent items are noted in HPI. Objective:     Vitals:    02/09/22 1418   BP: 120/82   Pulse: 78   Resp: 16   Temp: 97.8 °F (36.6 °C)   TempSrc: Temporal   SpO2: 98%   Weight: 151 lb (68.5 kg)   Height: 5' 7\" (1.702 m)       Physical Examination: General appearance - alert, well appearing, and in no distress, oriented to person, place, and time and normal appearing weight  Mental status - alert, oriented to person, place, and time, normal mood, behavior, speech, dress, motor activity, and thought processes  Chest - clear to auscultation, no wheezes, rales or rhonchi, symmetric air entry  Heart - normal rate, regular rhythm, normal S1, S2, no murmurs, rubs, clicks or gallops    No Known Allergies   Social History     Socioeconomic History    Marital status: SINGLE   Tobacco Use    Smoking status: Never Smoker    Smokeless tobacco: Never Used   Vaping Use    Vaping Use: Never used   Substance and Sexual Activity    Alcohol use:  Yes    Drug use: No    Sexual activity: Not Currently   Social History Narrative    ** Merged History Encounter **           Family History   Problem Relation Age of Onset    No Known Problems Mother     Hypertension Father     Elevated Lipids Father     Diabetes Maternal Grandmother     Hypertension Maternal Grandmother     Cancer Maternal Grandfather         liver cancer    Cancer Paternal Grandmother         unknown    Heart Attack Paternal Grandfather     Heart Disease Paternal Grandfather     Breast Cancer Paternal Aunt       Past Surgical History: Procedure Laterality Date    HX WISDOM TEETH EXTRACTION        History reviewed. No pertinent past medical history. Current Outpatient Medications   Medication Sig Dispense Refill    ferrous sulfate (IRON) 325 mg (65 mg iron) EC tablet Take 1 Tablet by mouth two (2) times daily (with meals). 60 Tablet 3    ibuprofen (ADVIL) 200 mg tablet Take 200 mg by mouth every six (6) hours as needed for Pain.  sertraline (ZOLOFT) 50 mg tablet Take 1/2 tab daily for 2 weeks followed by one tab PO daily afterwards. 30 Tablet 1        Assessment/ Plan:   Diagnoses and all orders for this visit:    1. Low hemoglobin  -     CBC WITH AUTOMATED DIFF; Future              Continue iron supplement. 2. Anxiety      Stable without med. Medication risks/benefits/costs/interactions/alternatives discussed with patient. Advised patient to call back or return to office if symptoms worsen/change/persist. If patient cannot reach us or should anything more severe/urgent arise he/she should proceed directly to the nearest emergency department. Discussed expected course/resolution/complications of diagnosis in detail with patient. Patient given a written after visit summary which includes her diagnoses, current medications and vitals. Patient expressed understanding with the diagnosis and plan. Follow-up and Dispositions    · Return in about 8 months (around 10/10/2022) for complete physical  and fasting blood work. Rebecca Wilson

## 2022-02-10 LAB
BASOPHILS # BLD: 0.1 K/UL (ref 0–0.1)
BASOPHILS NFR BLD: 1 % (ref 0–1)
DIFFERENTIAL METHOD BLD: ABNORMAL
EOSINOPHIL # BLD: 0.1 K/UL (ref 0–0.4)
EOSINOPHIL NFR BLD: 2 % (ref 0–7)
ERYTHROCYTE [DISTWIDTH] IN BLOOD BY AUTOMATED COUNT: 13.4 % (ref 11.5–14.5)
HCT VFR BLD AUTO: 41.7 % (ref 35–47)
HGB BLD-MCNC: 13.1 G/DL (ref 11.5–16)
IMM GRANULOCYTES # BLD AUTO: 0 K/UL (ref 0–0.04)
IMM GRANULOCYTES NFR BLD AUTO: 0 % (ref 0–0.5)
LYMPHOCYTES # BLD: 3 K/UL (ref 0.8–3.5)
LYMPHOCYTES NFR BLD: 40 % (ref 12–49)
MCH RBC QN AUTO: 29 PG (ref 26–34)
MCHC RBC AUTO-ENTMCNC: 31.4 G/DL (ref 30–36.5)
MCV RBC AUTO: 92.3 FL (ref 80–99)
MONOCYTES # BLD: 0.8 K/UL (ref 0–1)
MONOCYTES NFR BLD: 11 % (ref 5–13)
NEUTS SEG # BLD: 3.6 K/UL (ref 1.8–8)
NEUTS SEG NFR BLD: 46 % (ref 32–75)
NRBC # BLD: 0 K/UL (ref 0–0.01)
NRBC BLD-RTO: 0 PER 100 WBC
PLATELET # BLD AUTO: 276 K/UL (ref 150–400)
PMV BLD AUTO: 13 FL (ref 8.9–12.9)
RBC # BLD AUTO: 4.52 M/UL (ref 3.8–5.2)
WBC # BLD AUTO: 7.7 K/UL (ref 3.6–11)

## 2022-03-18 PROBLEM — E66.3 OVERWEIGHT (BMI 25.0-29.9): Status: ACTIVE | Noted: 2018-11-23

## 2022-05-09 ENCOUNTER — TELEPHONE (OUTPATIENT)
Dept: PRIMARY CARE CLINIC | Age: 22
End: 2022-05-09

## 2022-06-27 ENCOUNTER — OFFICE VISIT (OUTPATIENT)
Dept: OBGYN CLINIC | Age: 22
End: 2022-06-27
Payer: COMMERCIAL

## 2022-06-27 VITALS
BODY MASS INDEX: 22.91 KG/M2 | HEIGHT: 67 IN | SYSTOLIC BLOOD PRESSURE: 120 MMHG | DIASTOLIC BLOOD PRESSURE: 78 MMHG | WEIGHT: 146 LBS

## 2022-06-27 DIAGNOSIS — Z11.3 SCREENING FOR VENEREAL DISEASE: Primary | ICD-10-CM

## 2022-06-27 DIAGNOSIS — N92.6 IRREGULAR MENSES: ICD-10-CM

## 2022-06-27 PROCEDURE — 99213 OFFICE O/P EST LOW 20 MIN: CPT | Performed by: OBSTETRICS & GYNECOLOGY

## 2022-06-27 RX ORDER — NORETHINDRONE ACETATE AND ETHINYL ESTRADIOL 1MG-20(21)
KIT ORAL
COMMUNITY

## 2022-06-27 NOTE — PATIENT INSTRUCTIONS
Learning About Birth Control: Combination Pills  What are combination pills? Combination pills are used to prevent pregnancy. Most people call them \"the pill. \"  Combination pills release a regular dose of two hormones, estrogen and progestin. They prevent pregnancy in a few ways. They thicken the mucus in the cervix. This makes it hard for sperm to travel into the uterus. And they thin the lining of the uterus. This makes it harder for a fertilized egg to attach to the uterus. The hormones also can stop the ovaries from releasing an egg each month (ovulation). You have to take a pill every day to prevent pregnancy. The packages for these pills are different. The most common one has 3 weeks of hormone pills and 1 week of sugar pills. The sugar pills don't contain any hormones. You have your period on that week. But other packs have no sugar pills. You take hormone pills for the whole month instead. This is called continuous use. With this method, you will not get your period as often. Or you may not get it at all. How well do they work? In the first year of use:  · When combination pills are taken exactly as directed, fewer than 1 person out of 100 has an unplanned pregnancy. · When pills are not taken exactly as directed, such as forgetting to take them sometimes, 9 people out of 100 have an unplanned pregnancy. Be sure to tell your doctor about any health problems you have or medicines you take. Your doctor can help you choose the birth control method that is right for you. What are the advantages of combination pills? · These pills work better than barrier methods. Barrier methods include condoms and diaphragms. · They may reduce acne and heavy bleeding. They may also reduce cramping and other symptoms of PMS (premenstrual syndrome). · The pills let you control your periods. You can schedule your periods to be every month or every few months. Or you can choose not to have them at all.  You may take pills that continue to give you hormones during the whole month (continuous use). This protects against pregnancy and is also a safe way to avoid having your period. This may help if you have painful periods. · You don't have to interrupt sex to use the pills. What are the disadvantages of combination pills? · You have to take a pill at the same time every day to prevent pregnancy. · Combination pills don't protect against sexually transmitted infections (STIs), such as herpes or HIV/AIDS. If you aren't sure if your sex partner(s) might have an STI, use a condom to help protect against disease. · They may cause changes in your period. You may have little bleeding, skipped periods, or spotting. If you're using pills that give you hormones for the whole month, your periods will stop. But you may still have breakthrough bleeding. This usually isn't harmful and may decrease over time. · They may cause mood changes, less interest in sex, or weight gain. · Combination pills contain estrogen. They may not be right for you if you have certain health problems. Where can you learn more? Go to http://www.gray.com/  Enter Z362 in the search box to learn more about \"Learning About Birth Control: Combination Pills. \"  Current as of: November 22, 2021               Content Version: 13.2  © 1248-9969 [employer]. Care instructions adapted under license by [employer] (which disclaims liability or warranty for this information). If you have questions about a medical condition or this instruction, always ask your healthcare professional. [place] [address] any warranty or liability for your use of this information.

## 2022-06-27 NOTE — PROGRESS NOTES
Problem Visit    Jeremy Hodges is a 24 y.o. G0 presenting for follow up of AUB - h/o irregular menses q 2-3 months. At prior visit she TSH, PRL, and PCOS labs were normal. She was started on Junel OCPs, and has been doing well on this regimen, with regular light bleeding and mild cramping. Recent coitarche, no problems with IC. Requests STI testing today. Went to [employer], [employer] major, but wants to do photography after graduating. Ob/Gyn Hx:  G0   LMP- 1/6/22  Menarche- 10  Menses- regular  Contraception-ocp  STI- denies  ? SA-yes      Health maintenance:  Pap- 1/6/22 neg  Gardasil-3/3    History reviewed. No pertinent past medical history. Past Surgical History:   Procedure Laterality Date    HX WISDOM TEETH EXTRACTION         Family History   Problem Relation Age of Onset    No Known Problems Mother     Hypertension Father     Elevated Lipids Father     Diabetes Maternal Grandmother     Hypertension Maternal Grandmother     Cancer Maternal Grandfather         liver cancer    Cancer Paternal Grandmother         unknown    Heart Attack Paternal Grandfather     Heart Disease Paternal Grandfather     Breast Cancer Paternal Aunt        Social History     Socioeconomic History    Marital status: SINGLE     Spouse name: Not on file    Number of children: Not on file    Years of education: Not on file    Highest education level: Not on file   Occupational History    Not on file   Tobacco Use    Smoking status: Never Smoker    Smokeless tobacco: Never Used   Vaping Use    Vaping Use: Never used   Substance and Sexual Activity    Alcohol use:  Yes    Drug use: Yes     Types: Marijuana    Sexual activity: Yes     Partners: Male     Birth control/protection: Pill   Other Topics Concern    Not on file   Social History Narrative    ** Merged History Encounter **          Social Determinants of Health     Financial Resource Strain:     Difficulty of Paying Living Expenses: Not on file   Food Insecurity:     Worried About Running Out of Food in the Last Year: Not on file    Karie of Food in the Last Year: Not on file   Transportation Needs:     Lack of Transportation (Medical): Not on file    Lack of Transportation (Non-Medical): Not on file   Physical Activity:     Days of Exercise per Week: Not on file    Minutes of Exercise per Session: Not on file   Stress:     Feeling of Stress : Not on file   Social Connections:     Frequency of Communication with Friends and Family: Not on file    Frequency of Social Gatherings with Friends and Family: Not on file    Attends Yarsani Services: Not on file    Active Member of [address] [place] [address] [employer] or Organizations: Not on file    Attends Club or Organization Meetings: Not on file    Marital Status: Not on file   Intimate Partner Violence:     Fear of Current or Ex-Partner: Not on file    Emotionally Abused: Not on file    Physically Abused: Not on file    Sexually Abused: Not on file   Housing Stability:     Unable to Pay for Housing in the Last Year: Not on file    Number of [place] in the Last Year: Not on file    Unstable Housing in the Last Year: Not on file       Current Outpatient Medications   Medication Sig Dispense Refill    norethindrone-ethinyl estradiol (Junel FE 1/20, 28,) 1 mg-20 mcg (21)/75 mg (7) tab Take  by mouth.  ferrous sulfate (IRON) 325 mg (65 mg iron) EC tablet Take 1 Tablet by mouth two (2) times daily (with meals). (Patient taking differently: Take 325 mg by mouth once over twenty-four (24) hours.) 60 Tablet 3    ibuprofen (ADVIL) 200 mg tablet Take 200 mg by mouth every six (6) hours as needed for Pain.          No Known Allergies    Review of Systems - History obtained from the patient  Constitutional: negative for weight loss, fever, night sweats  HEENT: negative for hearing loss, earache, congestion, snoring, sorethroat  CV: negative for chest pain, palpitations, edema  Resp: negative for cough, shortness of breath, wheezing  GI: negative for change in bowel habits, abdominal pain, black or bloody stools  : negative for frequency, dysuria, hematuria, vaginal discharge  MSK: negative for back pain, joint pain, muscle pain  Breast: negative for breast lumps, nipple discharge, galactorrhea  Skin :negative for itching, rash, hives  Neuro: negative for dizziness, headache, confusion, weakness  Psych: negative for anxiety, depression, change in mood  Heme/lymph: negative for bleeding, bruising, pallor    Physical Exam  Visit Vitals  /78   Ht 5' 7\" (1.702 m)   Wt 146 lb (66.2 kg)   BMI 22.87 kg/m²       Constitutional  · Appearance: well-nourished, well developed, alert, in no acute distress    HENT  · Head and Face: appears normal    Chest  · Respiratory Effort: non-labored breathing  · Auscultation: CTAB, normal breath sounds    Cardiovascular  · Heart:  · Auscultation: regular rate and rhythm without murmur  · Extremities: no peripheral edema    Gastrointestinal  · Abdominal Examination: abdomen non-tender to palpation, normal bowel sounds, no masses present  · Liver and spleen: no hepatomegaly present, spleen not palpable  · Hernias: no hernias identified    Skin  · General Inspection: no rash, no lesions identified    Neurologic/Psychiatric  · Mental Status:  · Orientation: grossly oriented to person, place and time  · Mood and Affect: mood normal, affect appropriate      Assessment/Plan:  24 y.o. G0 presenting for follow up of AUB. Good cycle control on low-dose OCPs.  +coitarche.    -continue Junel OCPs  -reviewed labs obtained at prior visit (normal)  -STI testing (on urine) today  -consider pelvic US in future to evaluate for polycystic ovaries    RTC: for AE or sooner marv Dailey MD  6/27/2022  4:29 PM

## 2022-06-28 ENCOUNTER — TELEPHONE (OUTPATIENT)
Dept: OBGYN CLINIC | Age: 22
End: 2022-06-28

## 2022-06-28 NOTE — TELEPHONE ENCOUNTER
Received VM from Jay Butler from [facility] re: Urine specimen in grey cx tube on 06/27/2022. Pt of Dr. Chris Kinsey. Last visit 06/27/2022. Requests call back regarding sample that was sent.   Contact number for lab is [phone]

## 2022-06-28 NOTE — TELEPHONE ENCOUNTER
Returned call to Sandy Ramírez. States that a grey urine culture tube was received. Advised to cancel testing and will notify patient to see if she would like to return for a repeat sample.

## 2022-06-29 ENCOUNTER — LAB ONLY (OUTPATIENT)
Dept: OBGYN CLINIC | Age: 22
End: 2022-06-29

## 2022-06-29 DIAGNOSIS — Z01.419 ROUTINE GYNECOLOGICAL EXAMINATION: ICD-10-CM

## 2022-06-29 DIAGNOSIS — Z11.3 SCREENING FOR VENEREAL DISEASE: Primary | ICD-10-CM

## 2022-07-02 LAB
C TRACH RRNA SPEC QL NAA+PROBE: NEGATIVE
N GONORRHOEA RRNA SPEC QL NAA+PROBE: NEGATIVE
T VAGINALIS RRNA SPEC QL NAA+PROBE: NEGATIVE

## 2022-07-13 ENCOUNTER — OFFICE VISIT (OUTPATIENT)
Dept: PRIMARY CARE CLINIC | Age: 22
End: 2022-07-13
Payer: COMMERCIAL

## 2022-07-13 VITALS
HEIGHT: 67 IN | OXYGEN SATURATION: 97 % | SYSTOLIC BLOOD PRESSURE: 107 MMHG | DIASTOLIC BLOOD PRESSURE: 71 MMHG | RESPIRATION RATE: 18 BRPM | WEIGHT: 146.6 LBS | BODY MASS INDEX: 23.01 KG/M2 | HEART RATE: 60 BPM | TEMPERATURE: 97.5 F

## 2022-07-13 DIAGNOSIS — F41.9 ANXIETY AND DEPRESSION: ICD-10-CM

## 2022-07-13 DIAGNOSIS — Z76.89 ENCOUNTER TO ESTABLISH CARE: ICD-10-CM

## 2022-07-13 DIAGNOSIS — M25.561 CHRONIC PAIN OF BOTH KNEES: ICD-10-CM

## 2022-07-13 DIAGNOSIS — M25.562 CHRONIC PAIN OF BOTH KNEES: ICD-10-CM

## 2022-07-13 DIAGNOSIS — Z87.39 HISTORY OF HERNIATED INTERVERTEBRAL DISC: ICD-10-CM

## 2022-07-13 DIAGNOSIS — F32.A ANXIETY AND DEPRESSION: ICD-10-CM

## 2022-07-13 DIAGNOSIS — G89.29 CHRONIC PAIN OF BOTH KNEES: ICD-10-CM

## 2022-07-13 DIAGNOSIS — Z86.2 HISTORY OF IRON DEFICIENCY ANEMIA: Primary | ICD-10-CM

## 2022-07-13 PROCEDURE — 99214 OFFICE O/P EST MOD 30 MIN: CPT

## 2022-07-13 RX ORDER — DICLOFENAC SODIUM 10 MG/G
GEL TOPICAL AS NEEDED
Qty: 1 EACH | Refills: 1 | Status: SHIPPED | OUTPATIENT
Start: 2022-07-13

## 2022-07-13 RX ORDER — SERTRALINE HYDROCHLORIDE 50 MG/1
TABLET, FILM COATED ORAL
Qty: 30 TABLET | Refills: 1 | Status: SHIPPED | OUTPATIENT
Start: 2022-07-13 | End: 2022-09-16 | Stop reason: SDUPTHER

## 2022-07-13 NOTE — PROGRESS NOTES
[facility]   Mik Sanon 65[address], [address] [facility]  P: [phone]  F: [phone]    SUBJECTIVE     HPI:     Cierra Lea is a 24 y.o. female who is seen in the clinic for   Chief Complaint   Patient presents with   [facility] Establish Care    Anxiety    Back Pain          Previous PCP was Dr Arlen Chan. Last office visit was in 2022. She has a history of iron deficiency anemia. Was taking iron supplements but stopped due to constipation months ago. She is noticing dizziness for a couple of months, especially when going from sitting to standing. She used to have very heavy periods that were irregular. She has since started birth control, which is keeping periods regular and lighter. She denies history of hernias, hemorrhoids, aneurysms. No black/tarry stools or easy bruising. She c/o back pain, in the past patient states she was told she had a herniated disc in her lower back. She completed PT about 5 years ago, but has not had further treatment. She does not know how she got the herniated disc. She denies history of scoliosis, denies injuries/contact sports. Reports knee pain that \"can be unbearable\". No history of RA/polymyalgia in her family, patient unsure of provoking factors for knee pain. Would like to see physical therapist.     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 She is meeting with counselor through [facility] and has a limited number of visits. She was previously prescribed Zoloft 25mg with plan to increase to 50mg, she never increased to 50mg dose. She wants to try Zoloft again. She is also asking for resources for counseling. Family history:  Maternal grandfather  from a CVA in his 63's. Paternal aunt diagnosed with breast cancer at [facility] age 35-50's\". Health screenings:  Her OB-GYN is Dr Thu Izquierdo.  Last pap smear was 2022, which was normal. Compliant with birth control, LMP- currently. Immunizations:  Received 2 doses of primary COVID-19 vaccine, is scheduling booster dose soon. HPV UTD    Patient Active Problem List    Diagnosis    Overweight (BMI 25.0-29. 9)        History reviewed. No pertinent past medical history. Past Surgical History:   Procedure Laterality Date    HX WISDOM TEETH EXTRACTION       Social History     Socioeconomic History    Marital status: SINGLE     Spouse name: Not on file    Number of children: Not on file    Years of education: Not on file    Highest education level: Not on file   Occupational History    Not on file   Tobacco Use    Smoking status: Never Smoker    Smokeless tobacco: Never Used   Vaping Use    Vaping Use: Never used   Substance and Sexual Activity    Alcohol use: Yes    Drug use: Yes     Types: Marijuana     Comment: occasionally    Sexual activity: Yes     Partners: Male     Birth control/protection: Pill   Other Topics Concern    Not on file   Social History Narrative    ** Merged History Encounter **          Social Determinants of Health     Financial Resource Strain:     Difficulty of Paying Living Expenses: Not on file   Food Insecurity:     Worried About Running Out of Food in the Last Year: Not on file    Karie of Food in the Last Year: Not on file   Transportation Needs:     Lack of Transportation (Medical): Not on file    Lack of Transportation (Non-Medical):  Not on file   Physical Activity: Sufficiently Active    Days of Exercise per Week: 7 days    Minutes of Exercise per Session: 90 min   Stress:     Feeling of Stress : Not on file   Social Connections:     Frequency of Communication with Friends and Family: Not on file    Frequency of Social Gatherings with Friends and Family: Not on file    Attends Mormonism Services: Not on file    Active Member of Clubs or Organizations: Not on file    Attends Club or Organization Meetings: Not on file    Marital Status: Not on file   Intimate Partner Violence: Unknown    Fear of Current or Ex-Partner: Patient refused    Emotionally Abused: Patient refused    Physically Abused: Patient refused    Sexually Abused: Patient refused   Housing Stability:     Unable to Pay for Housing in the Last Year: Not on file    Number of Places Lived in the Last Year: Not on file    Unstable Housing in the Last Year: Not on file     Family History   Problem Relation Age of Onset    No Known Problems Mother     Hypertension Father    [place] Elevated Lipids Father     Diabetes Maternal Grandmother     Hypertension Maternal Grandmother     Cancer Maternal Grandfather         liver cancer    Cancer Paternal Grandmother         unknown    Heart Attack Paternal Grandfather     Heart Disease Paternal Grandfather     Breast Cancer Paternal Aunt      Immunization History   Administered Date(s) Administered    COVID-19, MODERNA BLUE border, Primary or Immunocompromised, (age 18y+), IM, 100 mcg/0.5mL 04/17/2021, 05/15/2021    DTaP 08/12/2004    HPV (9-valent) 12/17/2018, 02/21/2019, 06/27/2019    Influenza Vaccine 12/16/2016, 11/28/2017    Influenza Vaccine (Quad) PF (>6 Mo Flulaval, Fluarix, and >3 Yrs [address] [place] [address], Fluzone [id number]) 11/23/2018, 10/19/2021    MMR 08/12/2004    Meningococcal ACWY Vaccine 08/08/2018    Poliovirus vaccine 08/12/2004    Tdap 08/21/2015    Varicella Virus Vaccine 07/25/2013      No Known Allergies    Lab Only on 06/29/2022   Component Date Value Ref Range Status    C. trachomatis by ZEB 06/29/2022 Negative  Negative Final    N. gonorrhoeae by ZEB 06/29/2022 Negative  Negative Final    T. vaginalis by ZEB 06/29/2022 Negative  Negative Final      MRI LUMB SPINE WO CONT  Narrative: EXAM:  MRI LUMB SPINE WO CONT    HISTORY: Low back pain  INDICATION:  Low back pain.     COMPARISON: None    TECHNIQUE: MR imaging of the lumbar spine was performed using the following  sequences: sagittal T1, T2, STIR;  axial T1, T2.     CONTRAST: None.    FINDINGS:    There is normal alignment of the lumbar spine. Vertebral body heights are  maintained. There is no spondylolysis or spondylolisthesis. No pedicle marrow  edema. The conus medullaris terminates at T12-L1. Signal and caliber of the distal  spinal cord are within normal limits. The paraspinal soft tissues are within normal limits. Lower thoracic spine: No herniation or stenosis. L1-L2:  No herniation or stenosis. L2-L3:  No herniation or stenosis. L3-L4:  No herniation or stenosis. L4-L5:  Mild broad-based protrusion. Mild to moderate canal stenosis. Foramina  are patent. L5-S1:  No herniation or stenosis. Impression: IMPRESSION:  Disc protrusion at L4-5 with mild to moderate canal stenosis at L4-5. No  foraminal stenosis. There is no evidence of fracture or dislocation. No spondylolysis or  spondylolisthesis. Current Outpatient Medications   Medication Sig Dispense Refill    norethindrone-ethinyl estradiol (Junel FE 1/20, 28,) 1 mg-20 mcg (21)/75 mg (7) tab Take  by mouth.  ibuprofen (ADVIL) 200 mg tablet Take 200 mg by mouth every six (6) hours as needed for Pain.  ferrous sulfate (IRON) 325 mg (65 mg iron) EC tablet Take 1 Tablet by mouth two (2) times daily (with meals). (Patient not taking: Reported on 7/13/2022) 60 Tablet 3           The past medical history, past surgical history, and family history were reviewed and updated in the medical record. Lab values/Imaging were reviewed. The medications were reviewed and updated in the medical record. Immunizations were reviewed and updated in the medical record. All relevant preventative screenings reviewed and updated in the medical record. REVIEW OF SYSTEMS   Review of Systems   Constitutional: Negative for chills, fever and malaise/fatigue. Respiratory: Negative for cough and shortness of breath. Cardiovascular: Negative for chest pain and palpitations.    Gastrointestinal: Negative for abdominal pain, constipation, diarrhea, heartburn, nausea and vomiting. Musculoskeletal: Positive for back pain and joint pain. Negative for myalgias. Neurological: Positive for dizziness. Negative for seizures, weakness and headaches. Endo/Heme/Allergies: Negative for environmental allergies. Does not bruise/bleed easily. Psychiatric/Behavioral: Positive for depression. Negative for suicidal ideas. The patient is nervous/anxious. PHYSICAL EXAM   /71 (BP 1 Location: Left upper arm, BP Patient Position: Sitting, BP Cuff Size: Adult)   Pulse 60   Temp 97.5 °F (36.4 °C) (Temporal)   Resp 18   Ht 5' 7\" (1.702 m)   Wt 146 lb 9.6 oz (66.5 kg)   LMP 07/11/2022 (Exact Date)   SpO2 97%   BMI 22.96 kg/m²      Physical Exam  Constitutional:       General: She is not in acute distress. Appearance: She is normal weight. She is not toxic-appearing. Cardiovascular:      Rate and Rhythm: Normal rate and regular rhythm. Pulses: Normal pulses. Heart sounds: Normal heart sounds. Pulmonary:      Effort: Pulmonary effort is normal.      Breath sounds: Normal breath sounds. Musculoskeletal:      Right lower leg: No edema. Left lower leg: No edema. Skin:     General: Skin is warm and dry. Neurological:      Mental Status: She is alert and oriented to person, place, and time. Sensory: No sensory deficit. Motor: No weakness. Gait: Gait normal.   Psychiatric:         Mood and Affect: Mood normal.         Behavior: Behavior normal.         Thought Content: Thought content normal.            ASSESSMENT/ PLAN   Below is the assessment and plan developed based on review of pertinent history, physical exam, labs, studies, and medications. 1. History of iron deficiency anemia  -     Discussed increasing intake of iron rich foods, such as spinach, iron fortified cereals, raisins.   - CBC WITH AUTOMATED DIFF;  Future  -     REFERRAL TO DIETITIAN  -     IRON PROFILE; Future  2. History of herniated intervertebral disc  -     REFERRAL TO PHYSICAL THERAPY  3. Chronic pain of both knees  -     REFERRAL TO PHYSICAL THERAPY  -     diclofenac (VOLTAREN) 1 % gel; Apply  to affected area as needed for Pain., Normal, Disp-1 Each, R-1  - Encouraged ice/heat PRN, rest, elevation  4. Anxiety and depression  -     sertraline (ZOLOFT) 50 mg tablet; Take 1/2 tablet for 2 weeks. After 2 weeks, if well tolerated,  take 1 tablet daily. , Normal, Disp-30 Tablet, R-1  -     REFERRAL TO PSYCHOLOGY  - Denies SI, HI. Discussed risk/benefit of starting an antidepressant medication, including worsening depression. If she experiences this, let me know. If experiencing SI, HI, call 911.  5. Encounter to establish care  -     METABOLIC PANEL, COMPREHENSIVE; Future  -     CBC WITH AUTOMATED DIFF; Future      RTC in 1 month to discuss response to Zoloft. Disclaimer:  Advised patient to call back or return to office if symptoms worsen/change/persist.  Discussed expected course/resolution/complications of diagnosis in detail with patient. Medication risks/benefits/alternatives discussed with patient. Patient was given an after visit summary which includes diagnoses, current medications, & vitals. Discussed patient instructions and advised to read to all patient instructions regarding care. Patient expressed understanding with the diagnosis and plan.        Nathen Scott NP  7/13/2022

## 2022-07-13 NOTE — PATIENT INSTRUCTIONS
Joe Bee,  It was great meeting you today. Please schedule an appt for 30 days from now to discuss Zoloft. I am attaching information regarding iron rich foods as we discussed at your visit. Take careErica      Iron-Rich Diet: Care Instructions  Your Care Instructions     Your body needs iron to make hemoglobin. Hemoglobin is a substance in red blood cells that carries oxygen from the lungs to cells all through your body. If you do not get enough iron, your body makes fewer and smaller red blood cells. As a result, your body's cells may not get enough oxygen. Adult men need 8 milligrams of iron a day; adult women need 18 milligrams of iron a day. After menopause, women need 8 milligrams of iron a day. A pregnant woman needs 27 milligrams of iron a day. Infants and young children have higher iron needs relative to their size than other age groups. People who have lost blood because of ulcers or heavy menstrual periods may become very low in iron and may develop anemia. Most people can get the iron their bodies need by eating enough of certain iron-rich foods. Your doctor may recommend that you take an iron supplement along with eating an iron-rich diet. Follow-up care is a key part of your treatment and safety. Be sure to make and go to all appointments, and call your doctor if you are having problems. It's also a good idea to know your test results and keep a list of the medicines you take. How can you care for yourself at home? · Make iron-rich foods a part of your daily diet. Iron-rich foods include:  ? All meats, such as chicken, beef, lamb, pork, fish, and shellfish. Liver is especially high in iron. ? Leafy green vegetables. ? Raisins, peas, beans, lentils, barley, and eggs. ? Iron-fortified breakfast cereals. · Eat foods with vitamin C along with iron-rich foods. Vitamin C helps you absorb more iron from food. Drink a glass of orange juice or another citrus juice with your food.   · Eat meat and vegetables or grains together. The iron in meat helps your body absorb the iron in other foods. Where can you learn more? Go to http://digna-chantal.info/  Enter Z290 in the search box to learn more about \"Iron-Rich Diet: Care Instructions. \"  Current as of: September 8, 2021               Content Version: 13.2  © 2006-2022 [employer]. Care instructions adapted under license by [employer] (which disclaims liability or warranty for this information). If you have questions about a medical condition or this instruction, always ask your healthcare professional. [place] [address] any warranty or liability for your use of this information.

## 2022-07-13 NOTE — PROGRESS NOTES
Identified pt with two pt identifiers(name and ). Chief Complaint   Patient presents with    [facility] Care    Anxiety    Back Pain        3 most recent PHQ Screens 2022   Little interest or pleasure in doing things Not at all   Feeling down, depressed, irritable, or hopeless Not at all   Total Score PHQ 2 0   Trouble falling or staying asleep, or sleeping too much -   Feeling tired or having little energy -   Poor appetite, weight loss, or overeating -   Feeling bad about yourself - or that you are a failure or have let yourself or your family down -   Trouble concentrating on things such as school, work, reading, or watching TV -   Moving or speaking so slowly that other people could have noticed; or the opposite being so fidgety that others notice -   Thoughts of being better off dead, or hurting yourself in some way -   PHQ 9 Score -   How difficult have these problems made it for you to do your work, take care of your home and get along with others -        There were no vitals filed for this visit. Health Maintenance Due   Topic Date Due    COVID-19 Vaccine (3 - Booster for Moderna series) 10/15/2021        1. Have you been to the ER, urgent care clinic since your last visit? Hospitalized since your last visit? No    2. Have you seen or consulted any other health care providers outside of the [address] [place] [address] since your last visit? Include any pap smears or colon screening. No    3. For patients aged 39-70: Has the patient had a colonoscopy / FIT/ Cologuard? NA - based on age      If the patient is female:    4. For patients aged 41-77: Has the patient had a mammogram within the past 2 years? NA - based on age or sex      11. For patients aged 21-65: Has the patient had a pap smear?  Yes - no Care Gap present

## 2022-07-15 LAB — FERRITIN SERPL-MCNC: 10 NG/ML (ref 8–252)

## 2022-07-27 ENCOUNTER — HOSPITAL ENCOUNTER (OUTPATIENT)
Dept: PHYSICAL THERAPY | Age: 22
Discharge: HOME | End: 2022-07-27
Payer: COMMERCIAL

## 2022-07-27 PROCEDURE — 97161 PT EVAL LOW COMPLEX 20 MIN: CPT | Performed by: PHYSICAL THERAPIST

## 2022-07-27 NOTE — PROGRESS NOTES
PT INITIAL EVALUATION NOTE - [facility] 2-15    Patient Name: Kennedy Victor  Date:2022  : 2000  [x]  Patient  Verified  Payor: Chante  / Plan: [address] / Product Type: Managed Care Medicaid /    In time:100 PM  Out time:145 PM  Total Treatment Time (min): 45  Total Timed Codes (min): 15  1:1 Treatment Time ( only): 15   Visit #: 1     Treatment Area: Personal history of other diseases of the musculoskeletal system and connective tissue [Z87.39]  Pain in right knee [M25.561]  Pain in left knee [M25.562]  Other chronic pain [G89.29]    SUBJECTIVE  Pain Level (0-10 scale): Back: Current: 3 Worst: 10 Best: 1-2 Knees Current: 1 Worst: 10 Best 0   Any medication changes, allergies to medications, adverse drug reactions, diagnosis change, or new procedure performed?: [] No    [x] Yes (see summary sheet for update)  Subjective:    Patient referred to PT with a chief complaint of low back pain, knee pain, hip pain and upper back pain and she has history of ankle issues in the past. States that her L ankle will have a really sharp pain. Reports that the back pain has been present for a few years it began of insidious. She has had physical therapy and and chiropractor and had relief of symptoms. States that the pain has been more frequent recently since she is standing a lot at work. Knee pain has been present for the past year and began of insidious onset.  She goes to the gym and does running 3-4 days per week walks on incline 2-3 days, weight lifting including machines and free weights   Pain Location: Back: Across low back Knees: Anterior B knees  Pain Description: Back: tight, sharp stabbing, uncomfortable B knees: sharp   Paresthesias: none  Aggravating Factors: Back: prolonged standing > 5 hours, bending, lying flat on back B knees: running, standing  Relieving Factors: Back: stretching, massage, creams, ice B knees: stretching, massage, ice,   Current Functional Limitations: Pain and difficulty running and prolonged standing > 5 hours  PLOF: Able to run without knee pain prior to 1 year ago, longstanding history of back pain   Mechanism of Injury: none   Previous Treatment/Compliance: physical therapy, chiropractor   PMHx/Surgical Hx: unremarkable   Work Hx: retail   Living Situation: Lives with parents   Pt Goals: \"I have a feeling there's no way to get rid of it completely. Ease the pain, not be so uncomfortable. \"   Barriers: none   Motivation: good     OBJECTIVE/EXAMINATION  Observation:    Squat test: Normal    ROM:     Lumbar ROM:   Flexion Flexion fingertips 4 inches from floor  Extension WNL, pain low back  R SB WNL pain L side low back  L SB WNL pain R side low back     Hip PROM: B WFL    B knee ROM: WNL     Strength:     R Hip flexion 5/5  R Knee extension 5/5  R Knee flexion 5/5  R Ankle DF 5/5  R hip abduction 4-/5    L Hip flexion 5/5  L Knee extension 5/5  L Knee flexion 5/5  L Ankle DF 5/5  L hip abduction 4-/5       Joint mobility: hypermobile painful PA glides L1-5     Special tests:  90/90 +R, +L,       15 min Therapeutic Exercise:  [x] See flow sheet : Taught bridge with LE ext, s/l clam, iso abs with march, SLS with EC   Rationale: increase ROM and increase strength to improve the patients ability to perform ADLs              With   [x] TE   [] TA   [] neuro   [] other: Patient Education: [x] Review HEP    [] Progressed/Changed HEP based on:   [] positioning   [] body mechanics   [] transfers   [] heat/ice application    [x] other: hold knee extension machine, hold sit ups, hold deep squats, role of PT expected course of PT, form of planks, decrease speed of running warm up by walking prior to running      Other Objective/Functional Measures:NT    Pain Level (0-10 scale) post treatment: Not captured     ASSESSMENT/Changes in Function:     [x]  See Plan of [address], PT 7/27/2022  1:00 PM

## 2022-07-27 NOTE — PROGRESS NOTES
[facility]  [address]  [place], [address] [facility]  Phone: [phone]  Fax: [phone]    Plan of Care/Statement of Necessity for Physical Therapy Services  2-15    Patient name: Moses Fleischer  : 2000  Provider#: [id number]  Referral source: Rinku Vidal NP      Medical/Treatment Diagnosis: Personal history of other diseases of the musculoskeletal system and connective tissue [Z87.39]  Pain in right knee [M25.561]  Pain in left knee [M25.562]  Other chronic pain [G89.29]     Prior Hospitalization: see medical history     Comorbidities: unremarkable  Prior Level of Function: Able to run without knee pain prior to 1 year ago, longstanding history of back pain   Medications: Verified on Patient Summary List    Start of Care: 22      Onset Date: several years        The Plan of Care and following information is based on the information from the initial evaluation. Assessment/ key information: Patient presents with low back pain with decreased core strength and B knee pain with decreased hip strength limiting ability to perform functional activities such as running and jumping.      Evaluation Complexity History LOW Complexity : Zero comorbidities / personal factors that will impact the outcome / POC; Examination LOW Complexity : 1-2 Standardized tests and measures addressing body structure, function, activity limitation and / or participation in recreation  ;Presentation LOW Complexity : Stable, uncomplicated  ;Clinical Decision Making LOW Complexity : FOTO score of   Overall Complexity Rating: LOW     Problem List: pain affecting function, decrease ROM, decrease strength, impaired gait/ balance, decrease ADL/ functional abilitiies, and decrease activity tolerance   Treatment Plan may include any combination of the following: Therapeutic exercise, Therapeutic activities, Physical agent/modality, Manual therapy, and Patient education  Patient / Family readiness to learn indicated by: asking questions, trying to perform skills, and interest  Persons(s) to be included in education: patient (P)  Barriers to Learning/Limitations: None  Patient Goal (s): I have a feeling there's no way to get rid of it completely. Ease the pain, not be so uncomfortable  Patient Self Reported Health Status: good  Rehabilitation Potential: good    Short Term Goals: To be accomplished in 2 weeks:    1. Patient will be I in HEP to promote self management of symptoms. 2. Patient will report pain level at worst as less than or equal to 8/10 so they can perform ADLs without pain. Long Term Goals: To be accomplished in 4-6 weeks:    1. Patient will report pain level at worst as less than or equal to 6/10 so they can perform ADLs without pain. 2. Patient will be able to to jog > or = 25 minutes without limitation from knee pain. 3. Patient will be able to work full shift without limitation from back pain. Frequency / Duration: Patient to be seen 2 times per week for 4-8 weeks. Patient/ Caregiver education and instruction: exercises    [x]  Plan of care has been reviewed with PASCUAL Lael, PT 7/27/2022 2:24 PM    ________________________________________________________________________    I certify that the above Therapy Services are being furnished while the patient is under my care. I agree with the treatment plan and certify that this therapy is necessary.     [facility] Signature:____________________  Date:____________Time: _________

## 2022-08-02 ENCOUNTER — HOSPITAL ENCOUNTER (OUTPATIENT)
Dept: PHYSICAL THERAPY | Age: 22
Discharge: HOME | End: 2022-08-02
Payer: COMMERCIAL

## 2022-08-02 PROCEDURE — 97110 THERAPEUTIC EXERCISES: CPT | Performed by: PHYSICAL THERAPIST

## 2022-08-02 NOTE — PROGRESS NOTES
PT DAILY TREATMENT NOTE - [facility] 2-15    Patient Name: Chasity Dwyer  Date:2022  : 2000  [x]  Patient  Verified  Payor: [employer] / Plan: [address] / Product Type: Managed Care Medicaid /    In time:930 AM  Out time:1025  Total Treatment Time (min): 55  Total Timed Codes (min): 45  Visit #:  2    Treatment Area: Personal history of other diseases of the musculoskeletal system and connective tissue [Z87.39]  Pain in right knee [M25.561]  Pain in left knee [M25.562]  Other chronic pain [G89.29]    SUBJECTIVE  Pain Level (0-10 scale): 7  Any medication changes, allergies to medications, adverse drug reactions, diagnosis change, or new procedure performed?: [x] No    [] Yes (see summary sheet for update)  Subjective functional status/changes:   [] No changes reported  Patient reports that her R hip hurts today. She has made adjustments to her gym routine which has helped. OBJECTIVE    10 min Modality:[x]  Ice R hip    []  Heat  []  Ice massage   Rationale: decrease pain to improve the patients ability to perform ADLs    [x] Skin assessment post-treatment:  [x]intact []redness- no adverse reaction    []redness - adverse reaction:     45 min Therapeutic Exercise:  [x] See flow sheet :   Rationale: increase ROM and increase strength to improve the patients ability to perform ADLs                With   [x] TE   [] TA   [] neuro   [] other: Patient Education: [x] Review HEP    [] Progressed/Changed HEP based on:   [] positioning   [] body mechanics   [] transfers   [] heat/ice application    [] other:      Other Objective/Functional Measures: NT     Pain Level (0-10 scale) post treatment: Not captured     ASSESSMENT/Changes in Function:   Patient tolerated exercise progression well today.    Patient will continue to benefit from skilled PT services to modify and progress therapeutic interventions, address functional mobility deficits, address ROM deficits, address strength deficits, and analyze and address soft tissue restrictions to attain remaining goals. Progress towards goals / Updated goals:  Short Term Goals: To be accomplished in 2 weeks:              1. Patient will be I in HEP to promote self management of symptoms. PROGRESSING              2. Patient will report pain level at worst as less than or equal to 8/10 so they can perform ADLs without pain. PROGRESSING  Long Term Goals: To be accomplished in 4-6 weeks:              1.  Patient will report pain level at worst as less than or equal to 6/10 so they can perform ADLs without pain. 2. Patient will be able to to jog > or = 25 minutes without limitation from knee pain. 3. Patient will be able to work full shift without limitation from back pain.      PLAN  [x]  Upgrade activities as tolerated     [x]  Continue plan of care  []  Update interventions per flow sheet       []  Discharge due to:_  []  Other:_      Gris Jovel, PT 8/2/2022

## 2022-08-04 ENCOUNTER — HOSPITAL ENCOUNTER (OUTPATIENT)
Dept: PHYSICAL THERAPY | Age: 22
Discharge: HOME | End: 2022-08-04
Payer: COMMERCIAL

## 2022-08-04 PROCEDURE — 97110 THERAPEUTIC EXERCISES: CPT | Performed by: PHYSICAL THERAPIST

## 2022-08-04 NOTE — PROGRESS NOTES
PT DAILY TREATMENT NOTE - [facility] -15    Patient Name: Reyes Shelling  Date:2022  : 2000  [x]  Patient  Verified  Payor: [employer] 22 / Plan: [address] / Product Type: Managed Care Medicaid /    In time: 6283 AM  Out time:1255  Total Treatment Time (min): 60  Total Timed Codes (min): 60  Visit #:  3    Treatment Area: Personal history of other diseases of the musculoskeletal system and connective tissue [Z87.39]  Pain in right knee [M25.561]  Pain in left knee [M25.562]  Other chronic pain [G89.29]    SUBJECTIVE  Pain Level (0-10 scale): 2  Any medication changes, allergies to medications, adverse drug reactions, diagnosis change, or new procedure performed?: [x] No    [] Yes (see summary sheet for update)  Subjective functional status/changes:   [] No changes reported  Patient reports that she went to chiropractor yesterday and took a rest day. Her back, hip and knees feel better. She still has some stiffness in her neck/shoulder. OBJECTIVE    10 min Modality:[x]  Ice R hip    []  Heat  []  Ice massage   Rationale: decrease pain to improve the patients ability to perform ADLs    [x] Skin assessment post-treatment:  [x]intact []redness- no adverse reaction    []redness - adverse reaction:     60 min Therapeutic Exercise:  [x] See flow sheet :   Rationale: increase ROM and increase strength to improve the patients ability to perform ADLs                With   [x] TE   [] TA   [] neuro   [] other: Patient Education: [x] Review HEP    [] Progressed/Changed HEP based on:   [] positioning   [] body mechanics   [] transfers   [] heat/ice application    [] other:      Other Objective/Functional Measures: NT     Pain Level (0-10 scale) post treatment: Not captured     ASSESSMENT/Changes in Function:   Patient tolerated exercise well today. Good form with bird dog exercise, good HEP compliance.    Patient will continue to benefit from skilled PT services to modify and progress therapeutic interventions, address functional mobility deficits, address ROM deficits, address strength deficits, and analyze and address soft tissue restrictions to attain remaining goals. Progress towards goals / Updated goals:  Short Term Goals: To be accomplished in 2 weeks:              1. Patient will be I in HEP to promote self management of symptoms. PROGRESSING              2. Patient will report pain level at worst as less than or equal to 8/10 so they can perform ADLs without pain. PROGRESSING  Long Term Goals: To be accomplished in 4-6 weeks:              1.  Patient will report pain level at worst as less than or equal to 6/10 so they can perform ADLs without pain. 2. Patient will be able to to jog > or = 25 minutes without limitation from knee pain. 3. Patient will be able to work full shift without limitation from back pain.      PLAN  [x]  Upgrade activities as tolerated     [x]  Continue plan of care  []  Update interventions per flow sheet       []  Discharge due to:_  []  Other:_      Raj Garcia, PT 8/4/2022

## 2022-08-09 ENCOUNTER — HOSPITAL ENCOUNTER (OUTPATIENT)
Dept: PHYSICAL THERAPY | Age: 22
Discharge: HOME | End: 2022-08-09
Payer: COMMERCIAL

## 2022-08-09 PROCEDURE — 97110 THERAPEUTIC EXERCISES: CPT | Performed by: PHYSICAL THERAPY ASSISTANT

## 2022-08-09 NOTE — PROGRESS NOTES
PT DAILY TREATMENT NOTE - [facility] 2-15    Patient Name: Chasity Dwyer  Date:2022  : 2000  [x]  Patient  Verified  Payor: [employer] 22 / Plan: [address] / Product Type: Managed Care Medicaid /    In time: 9:30 AM  Out time:10:25  Total Treatment Time (min): 55  Total Timed Codes (min): 55  Visit #:  4    Treatment Area: Personal history of other diseases of the musculoskeletal system and connective tissue [Z87.39]  Pain in right knee [M25.561]  Pain in left knee [M25.562]  Other chronic pain [G89.29]    SUBJECTIVE  Pain Level (0-10 scale): 1  Any medication changes, allergies to medications, adverse drug reactions, diagnosis change, or new procedure performed?: [x] No    [] Yes (see summary sheet for update)  Subjective functional status/changes:   [] No changes reported  Patient reports standing for long periods of time ~ 4 hours aggravates her back the most, \"hips and knees are more out of the blue most of the time. \" States she is a better runner now so her knees aren't as painful, states she is running on the treadmill for 45 minutes. OBJECTIVE    decl min Modality:[x]  Ice R hip    []  Heat  []  Ice massage   Rationale: decrease pain to improve the patients ability to perform ADLs    [x] Skin assessment post-treatment:  [x]intact []redness- no adverse reaction    []redness - adverse reaction:     55 min Therapeutic Exercise:  [x] See flow sheet :   Rationale: increase ROM and increase strength to improve the patients ability to perform ADLs                With   [x] TE   [] TA   [] neuro   [] other: Patient Education: [x] Review HEP    [] Progressed/Changed HEP based on:   [] positioning   [] body mechanics   [] transfers   [] heat/ice application    [] other:      Other Objective/Functional Measures: NT     Pain Level (0-10 scale) post treatment: 0/10    ASSESSMENT/Changes in Function:   Educated patient on wearing supportive footwear especially at work.  Gave patient information regarding [employer]. Patient will continue to benefit from skilled PT services to modify and progress therapeutic interventions, address functional mobility deficits, address ROM deficits, address strength deficits, and analyze and address soft tissue restrictions to attain remaining goals. Progress towards goals / Updated goals:  Short Term Goals: To be accomplished in 2 weeks:              1. Patient will be I in HEP to promote self management of symptoms. PROGRESSING              2. Patient will report pain level at worst as less than or equal to 8/10 so they can perform ADLs without pain. PROGRESSING  Long Term Goals: To be accomplished in 4-6 weeks:              1.  Patient will report pain level at worst as less than or equal to 6/10 so they can perform ADLs without pain. 2. Patient will be able to to jog > or = 25 minutes without limitation from knee pain. 3. Patient will be able to work full shift without limitation from back pain.      PLAN  [x]  Upgrade activities as tolerated     [x]  Continue plan of care  []  Update interventions per flow sheet       []  Discharge due to:_  []  Other:_      Kunal Strickland, PASCUAL 8/9/2022

## 2022-08-11 ENCOUNTER — HOSPITAL ENCOUNTER (OUTPATIENT)
Dept: PHYSICAL THERAPY | Age: 22
Discharge: HOME | End: 2022-08-11
Payer: COMMERCIAL

## 2022-08-11 PROCEDURE — 97110 THERAPEUTIC EXERCISES: CPT | Performed by: PHYSICAL THERAPIST

## 2022-08-11 PROCEDURE — 97140 MANUAL THERAPY 1/> REGIONS: CPT | Performed by: PHYSICAL THERAPIST

## 2022-08-11 NOTE — PROGRESS NOTES
PT DAILY TREATMENT NOTE - [facility] 2-15    Patient Name: August Minot  Date:2022  : 2000  [x]  Patient  Verified  Payor: [employer]isner[employer] / Plan: [address] / Product Type: Managed Care Medicaid /    In time: 8:00 AM  Out time:  8:55  Total Treatment Time (min): 55  Total Timed Codes (min): 55  Visit #:  5    Treatment Area: Personal history of other diseases of the musculoskeletal system and connective tissue [Z87.39]  Pain in right knee [M25.561]  Pain in left knee [M25.562]  Other chronic pain [G89.29]    SUBJECTIVE  Pain Level (0-10 scale): 1  Any medication changes, allergies to medications, adverse drug reactions, diagnosis change, or new procedure performed?: [x] No    [] Yes (see summary sheet for update)  Subjective functional status/changes:   [] No changes reported  States right side feels tight. \"Knees hurt some. \"    OBJECTIVE  Right ASIS low vs left  Tenderness right SI joint  Lateral tracking patella's noted with quad set    decl min Modality:[x]  Ice R hip    []  Heat  []  Ice massage   Rationale: decrease pain to improve the patients ability to perform ADLs    [x] Skin assessment post-treatment:  [x]intact []redness- no adverse reaction    []redness - adverse reaction:     45 min Therapeutic Exercise:  [x] See flow sheet :   Rationale: increase ROM and increase strength to improve the patients ability to perform ADLs      10 min Manual Therapy:   MET with right hamstring for right anterior innominate  Manual hamstring stretch B    Rationale: decrease pain, increase ROM, and increase tissue extensibility to improve the patients ability to perform ADL's.             With   [x] TE   [] TA   [] neuro   [] other: Patient Education: [x] Review HEP    [] Progressed/Changed HEP based on:   [] positioning   [] body mechanics   [] transfers   [] heat/ice application    [] other:      Other Objective/Functional Measures: NT     Pain Level (0-10 scale) post treatment: 0/10    ASSESSMENT/Changes in Function:   Patient will continue to benefit from skilled PT services to modify and progress therapeutic interventions, address functional mobility deficits, address ROM deficits, address strength deficits, and analyze and address soft tissue restrictions to attain remaining goals. Progress towards goals / Updated goals:  Short Term Goals: To be accomplished in 2 weeks:              1. Patient will be I in HEP to promote self management of symptoms. PROGRESSING              2. Patient will report pain level at worst as less than or equal to 8/10 so they can perform ADLs without pain. PROGRESSING  Long Term Goals: To be accomplished in 4-6 weeks:              1.  Patient will report pain level at worst as less than or equal to 6/10 so they can perform ADLs without pain. 2. Patient will be able to to jog > or = 25 minutes without limitation from knee pain. 3. Patient will be able to work full shift without limitation from back pain.      PLAN  [x]  Upgrade activities as tolerated     [x]  Continue plan of care  []  Update interventions per flow sheet       []  Discharge due to:_  []  Other:_      Yuni Toledo, PT 8/11/2022

## 2022-08-12 ENCOUNTER — OFFICE VISIT (OUTPATIENT)
Dept: PRIMARY CARE CLINIC | Age: 22
End: 2022-08-12
Payer: COMMERCIAL

## 2022-08-12 ENCOUNTER — TELEPHONE (OUTPATIENT)
Dept: PRIMARY CARE CLINIC | Age: 22
End: 2022-08-12

## 2022-08-12 VITALS
HEART RATE: 84 BPM | WEIGHT: 146.4 LBS | HEIGHT: 67 IN | DIASTOLIC BLOOD PRESSURE: 77 MMHG | RESPIRATION RATE: 18 BRPM | SYSTOLIC BLOOD PRESSURE: 120 MMHG | OXYGEN SATURATION: 98 % | BODY MASS INDEX: 22.98 KG/M2 | TEMPERATURE: 97.5 F

## 2022-08-12 DIAGNOSIS — F32.A ANXIETY AND DEPRESSION: ICD-10-CM

## 2022-08-12 DIAGNOSIS — G89.29 CHRONIC PAIN OF BOTH KNEES: ICD-10-CM

## 2022-08-12 DIAGNOSIS — M25.562 CHRONIC PAIN OF BOTH KNEES: ICD-10-CM

## 2022-08-12 DIAGNOSIS — Z87.39 HISTORY OF HERNIATED INTERVERTEBRAL DISC: ICD-10-CM

## 2022-08-12 DIAGNOSIS — M25.561 CHRONIC PAIN OF BOTH KNEES: ICD-10-CM

## 2022-08-12 DIAGNOSIS — F41.9 ANXIETY AND DEPRESSION: ICD-10-CM

## 2022-08-12 DIAGNOSIS — J02.9 SORE THROAT: Primary | ICD-10-CM

## 2022-08-12 LAB
S PYO AG THROAT QL: NEGATIVE
VALID INTERNAL CONTROL?: YES

## 2022-08-12 PROCEDURE — 87880 STREP A ASSAY W/OPTIC: CPT

## 2022-08-12 PROCEDURE — 99214 OFFICE O/P EST MOD 30 MIN: CPT

## 2022-08-12 NOTE — PROGRESS NOTES
1. \"Have you been to the ER, urgent care clinic since your last visit? Hospitalized since your last visit? \" No    2. \"Have you seen or consulted any other health care providers outside of the [address] [place] [address] since your last visit? \" No     3. For patients aged 39-70: Has the patient had a colonoscopy / FIT/ Cologuard? NA - based on age      If the patient is female:    4. For patients aged 41-77: Has the patient had a mammogram within the past 2 years? NA - based on age or sex      11. For patients aged 21-65: Has the patient had a pap smear? Yes - no Care Gap present    Chief Complaint   Patient presents with    Sore Throat     Possible strep.

## 2022-08-12 NOTE — PROGRESS NOTES
[facility]   [address] [facility], [facility]  P: [phone]  F: [phone]    SUBJECTIVE     HPI:     Frank Duran is a 25 y.o. female who is seen in the clinic for   Chief Complaint   Patient presents with    Sore Throat     Possible strep. Established patient here with c/o sore throat, possible strep. Sore throat started 8/9. Noticed that she had white patches on the back of the throat, swollen lymph nodes, and tonsils enlarged. Some pain with swallowing. Yesterday, she noticed the white patches on her tonsils went down, today she thinks they are gone. Throat doesn't hurt as much today but some pain with swallowing. This morning she woke up with rhinorrhea, sneezing. Denies sick contacts. Has tried throat spray which helped. Today she feels that she is improving overall. At our last appt, we discussed her anxiety, depression and started Zoloft. Patient states she feels that Zoloft is working well but has some nausea right after taking the medication. Nausea subsides with time, no vomiting. She is continuing to [facility], but was given a limit to the number of visits she has there. She still has the referral I gave her to make an appointment with another counselor. She denies SI, HI. She has a history of a herniated intervertebral disc and chronic knee pain. She was referred to physical therapy at last appt. She reports an improvement since started physical therapy. Patient Active Problem List    Diagnosis    Overweight (BMI 25.0-29. 9)        History reviewed. No pertinent past medical history.   Past Surgical History:   Procedure Laterality Date    HX WISDOM TEETH EXTRACTION       Social History     Socioeconomic History    Marital status: SINGLE     Spouse name: Not on file    Number of children: Not on file    Years of education: Not on file    Highest education level: Not on file   Occupational History    Not on file Tobacco Use    Smoking status: Never    Smokeless tobacco: Never   Vaping Use    Vaping Use: Never used   Substance and Sexual Activity    Alcohol use: Yes    Drug use: Yes     Types: Marijuana     Comment: occasionally    Sexual activity: Yes     Partners: Male     Birth control/protection: Pill   Other Topics Concern    Not on file   Social History Narrative    ** Merged History Encounter **          Social Determinants of Health     Financial Resource Strain: Not on file   Food Insecurity: Not on file   Transportation Needs: Not on file   Physical Activity: Sufficiently Active    Days of Exercise per Week: 7 days    Minutes of Exercise per Session: 90 min   Stress: Not on file   Social Connections: Not on file   Intimate Partner Violence: Unknown    Fear of Current or Ex-Partner: Patient refused    Emotionally Abused: Patient refused    Physically Abused: Patient refused    Sexually Abused: Patient refused   Housing Stability: Not on file     Family History   Problem Relation Age of Onset    No Known Problems Mother     Hypertension Father     Elevated Lipids Father     Diabetes Maternal Grandmother     Hypertension Maternal Grandmother     Cancer Maternal Grandfather         liver cancer    Cancer Paternal Grandmother         unknown    Heart Attack Paternal Grandfather     Heart Disease Paternal Grandfather     Breast Cancer Paternal Aunt      Immunization History   Administered Date(s) Administered    COVID-19, MODERNA BLUE border, Primary or Immunocompromised, (age 18y+), IM, 100 mcg/0.5mL 04/17/2021, 05/15/2021    DTaP 08/12/2004    HPV (9-valent) 12/17/2018, 02/21/2019, 06/27/2019    Influenza Vaccine 12/16/2016, 11/28/2017    Influenza Vaccine (Quad) PF (>6 Mo Flulaval, Fluarix, and >3 Yrs Afluria, Fluzone 30028) 11/23/2018, 10/19/2021    MMR 08/12/2004    Meningococcal ACWY Vaccine 08/08/2018    Poliovirus vaccine 08/12/2004    Tdap 08/21/2015    Varicella Virus Vaccine 07/25/2013      No Known Allergies    Orders Only on 07/13/2022   Component Date Value Ref Range Status    Ferritin 07/13/2022 10  8 - 252 NG/ML Final   Orders Only on 07/13/2022   Component Date Value Ref Range Status    Iron 07/13/2022 114  35 - 150 ug/dL Final    TIBC 07/13/2022 551 (A) 250 - 450 ug/dL Final    Iron % saturation 07/13/2022 21  20 - 50 % Final    WBC 07/13/2022 4.8  3.6 - 11.0 K/uL Final    RBC 07/13/2022 4.14  3.80 - 5.20 M/uL Final    HGB 07/13/2022 12.4  11.5 - 16.0 g/dL Final    HCT 07/13/2022 39.3  35.0 - 47.0 % Final    MCV 07/13/2022 94.9  80.0 - 99.0 FL Final    MCH 07/13/2022 30.0  26.0 - 34.0 PG Final    MCHC 07/13/2022 31.6  30.0 - 36.5 g/dL Final    RDW 07/13/2022 13.2  11.5 - 14.5 % Final    PLATELET 09/99/7376 295  150 - 400 K/uL Final    MPV 07/13/2022 12.7  8.9 - 12.9 FL Final    NRBC 07/13/2022 0.0  0  WBC Final    ABSOLUTE NRBC 07/13/2022 0.00  0.00 - 0.01 K/uL Final    NEUTROPHILS 07/13/2022 47  32 - 75 % Final    LYMPHOCYTES 07/13/2022 40  12 - 49 % Final    MONOCYTES 07/13/2022 10  5 - 13 % Final    EOSINOPHILS 07/13/2022 2  0 - 7 % Final    BASOPHILS 07/13/2022 1  0 - 1 % Final    IMMATURE GRANULOCYTES 07/13/2022 0  0.0 - 0.5 % Final    ABS. NEUTROPHILS 07/13/2022 2.3  1.8 - 8.0 K/UL Final    ABS. LYMPHOCYTES 07/13/2022 1.9  0.8 - 3.5 K/UL Final    ABS. MONOCYTES 07/13/2022 0.5  0.0 - 1.0 K/UL Final    ABS. EOSINOPHILS 07/13/2022 0.1  0.0 - 0.4 K/UL Final    ABS. BASOPHILS 07/13/2022 0.1  0.0 - 0.1 K/UL Final    ABS. IMM.  GRANS. 07/13/2022 0.0  0.00 - 0.04 K/UL Final    DF 07/13/2022 AUTOMATED    Final    Sodium 07/13/2022 142  136 - 145 mmol/L Final    Potassium 07/13/2022 4.1  3.5 - 5.1 mmol/L Final    Chloride 07/13/2022 109 (A) 97 - 108 mmol/L Final    CO2 07/13/2022 28  21 - 32 mmol/L Final    Anion gap 07/13/2022 5  5 - 15 mmol/L Final    Glucose 07/13/2022 86  65 - 100 mg/dL Final    BUN 07/13/2022 20  6 - 20 MG/DL Final    Creatinine 07/13/2022 0.80  0.55 - 1.02 MG/DL Final BUN/Creatinine ratio 07/13/2022 25 (A) 12 - 20   Final    GFR est AA 07/13/2022 >60  >60 ml/min/1.73m2 Final    GFR est non-AA 07/13/2022 >60  >60 ml/min/1.73m2 Final    Estimated GFR is calculated using the IDMS-traceable Modification of Diet in Renal Disease (MDRD) Study equation, reported for both  Americans (GFRAA) and non- Americans (GFRNA), and normalized to 1.73m2 body surface area. The physician must decide which value applies to the patient. Calcium 07/13/2022 9.2  8.5 - 10.1 MG/DL Final    Bilirubin, total 07/13/2022 0.3  0.2 - 1.0 MG/DL Final    ALT (SGPT) 07/13/2022 29  12 - 78 U/L Final    AST (SGOT) 07/13/2022 14 (A) 15 - 37 U/L Final    Alk. phosphatase 07/13/2022 47  45 - 117 U/L Final    Protein, total 07/13/2022 7.1  6.4 - 8.2 g/dL Final    Albumin 07/13/2022 3.6  3.5 - 5.0 g/dL Final    Globulin 07/13/2022 3.5  2.0 - 4.0 g/dL Final    A-G Ratio 07/13/2022 1.0 (A) 1.1 - 2.2   Final   Lab Only on 06/29/2022   Component Date Value Ref Range Status    C. trachomatis by ZEB 06/29/2022 Negative  Negative Final    N. gonorrhoeae by ZEB 06/29/2022 Negative  Negative Final    T. vaginalis by ZEB 06/29/2022 Negative  Negative Final      MRI LUMB SPINE WO CONT  Narrative: EXAM:  MRI LUMB SPINE WO CONT    HISTORY: Low back pain  INDICATION:  Low back pain. COMPARISON: None    TECHNIQUE: MR imaging of the lumbar spine was performed using the following  sequences: sagittal T1, T2, STIR;  axial T1, T2.     CONTRAST:  None. FINDINGS:    There is normal alignment of the lumbar spine. Vertebral body heights are  maintained. There is no spondylolysis or spondylolisthesis. No pedicle marrow  edema. The conus medullaris terminates at T12-L1. Signal and caliber of the distal  spinal cord are within normal limits. The paraspinal soft tissues are within normal limits. Lower thoracic spine: No herniation or stenosis. L1-L2:  No herniation or stenosis.     L2-L3:  No herniation or stenosis. L3-L4:  No herniation or stenosis. L4-L5:  Mild broad-based protrusion. Mild to moderate canal stenosis. Foramina  are patent. L5-S1:  No herniation or stenosis. Impression: IMPRESSION:  Disc protrusion at L4-5 with mild to moderate canal stenosis at L4-5. No  foraminal stenosis. There is no evidence of fracture or dislocation. No spondylolysis or  spondylolisthesis. Current Outpatient Medications   Medication Sig Dispense Refill    sertraline (ZOLOFT) 50 mg tablet Take 1/2 tablet for 2 weeks. After 2 weeks, if well tolerated,  take 1 tablet daily. 30 Tablet 1    diclofenac (VOLTAREN) 1 % gel Apply  to affected area as needed for Pain. 1 Each 1    norethindrone-ethinyl estradiol (JUNEL FE 1/20) 1 mg-20 mcg (21)/75 mg (7) tab Take  by mouth. ibuprofen (MOTRIN) 200 mg tablet Take 200 mg by mouth every six (6) hours as needed for Pain. ferrous sulfate (IRON) 325 mg (65 mg iron) EC tablet Take 1 Tablet by mouth two (2) times daily (with meals). (Patient not taking: No sig reported) 60 Tablet 3           The past medical history, past surgical history, and family history were reviewed and updated in the medical record. Lab values/Imaging were reviewed. The medications were reviewed and updated in the medical record. Immunizations were reviewed and updated in the medical record. All relevant preventative screenings reviewed and updated in the medical record. REVIEW OF SYSTEMS   Review of Systems   Constitutional:  Positive for chills. Negative for fever and malaise/fatigue. HENT:  Positive for congestion and sore throat. Negative for sinus pain. Respiratory:  Negative for cough, shortness of breath, wheezing and stridor. Cardiovascular:  Negative for chest pain, palpitations and leg swelling. Musculoskeletal:  Positive for back pain and joint pain. Neurological:  Negative for headaches. Psychiatric/Behavioral:  Positive for depression. Negative for suicidal ideas. PHYSICAL EXAM   /77 (BP 1 Location: Right upper arm, BP Patient Position: Sitting)   Pulse 84   Temp 97.5 °F (36.4 °C) (Temporal)   Resp 18   Ht 5' 7\" (1.702 m)   Wt 146 lb 6.4 oz (66.4 kg)   LMP 08/08/2022   SpO2 98%   BMI 22.93 kg/m²      Physical Exam  Constitutional:       General: She is not in acute distress. Appearance: She is normal weight. She is not toxic-appearing. HENT:      Mouth/Throat:      Pharynx: Uvula midline. Posterior oropharyngeal erythema present. Cardiovascular:      Rate and Rhythm: Normal rate and regular rhythm. Pulses: Normal pulses. Heart sounds: Normal heart sounds. Pulmonary:      Effort: Pulmonary effort is normal. No respiratory distress. Breath sounds: Normal breath sounds. No wheezing or rales. Lymphadenopathy:      Head:      Right side of head: Submandibular adenopathy present. No submental, preauricular, posterior auricular or occipital adenopathy. Left side of head: Submandibular adenopathy present. No submental, preauricular, posterior auricular or occipital adenopathy. Skin:     General: Skin is warm and dry. Neurological:      Mental Status: She is alert and oriented to person, place, and time. Mental status is at baseline. Sensory: No sensory deficit. Motor: No weakness. Gait: Gait normal.   Psychiatric:         Mood and Affect: Mood normal.         Behavior: Behavior normal.         Thought Content: Thought content normal.         Judgment: Judgment normal.          ASSESSMENT/ PLAN   Below is the assessment and plan developed based on review of pertinent history, physical exam, labs, studies, and medications. 1. Sore throat  -     AMB POC RAPID STREP A  - Rapid strep negative. Suspect viral URI. - Advised symptomatic treatment for sore throat with throat lozenges, warm tea, salt water gargles, OTC throat spray, Ibuprofen or Tylenol as needed.   - Continue to monitor symptoms and if symptoms worsen or do not continue to improve, return to clinic. 2. Chronic pain of both knees  - Continue voltaren gel, physical therapy. 3. Anxiety and depression  - Continue daily Zoloft, counseling.   - PRN Zofran offered, patient declines for now. 4. History of herniated intervertebral disc   - Continue physical therapy          Disclaimer:  Advised patient to call back or return to office if symptoms worsen/change/persist.  Discussed expected course/resolution/complications of diagnosis in detail with patient. Medication risks/benefits/alternatives discussed with patient. Patient was given an after visit summary which includes diagnoses, current medications, & vitals. Discussed patient instructions and advised to read to all patient instructions regarding care. Patient expressed understanding with the diagnosis and plan.        Sarah Herrera NP  8/12/2022

## 2022-08-16 ENCOUNTER — HOSPITAL ENCOUNTER (OUTPATIENT)
Dept: PHYSICAL THERAPY | Age: 22
Discharge: HOME | End: 2022-08-16
Payer: COMMERCIAL

## 2022-08-16 PROCEDURE — 97110 THERAPEUTIC EXERCISES: CPT | Performed by: PHYSICAL THERAPIST

## 2022-08-16 NOTE — PROGRESS NOTES
PT DAILY TREATMENT NOTE - [facility] 2-15    Patient Name: Wendy Doherty  Date:2022  : 2000  [x]  Patient  Verified  Payor: Chante 22 / Plan: [address] / Product Type: Managed Care Medicaid /    In time: 255 PM  Out time:  400  P  Total Treatment Time (min): 65  Total Timed Codes (min): 55  Visit #:  6    Treatment Area: Personal history of other diseases of the musculoskeletal system and connective tissue [Z87.39]  Pain in right knee [M25.561]  Pain in left knee [M25.562]  Other chronic pain [G89.29]    SUBJECTIVE  Pain Level (0-10 scale): 0  Any medication changes, allergies to medications, adverse drug reactions, diagnosis change, or new procedure performed?: [x] No    [] Yes (see summary sheet for update)  Subjective functional status/changes:   [] No changes reported  Patient reports that her back feels a bit tight but overall good. OBJECTIVE      decl min Modality:[x]  Ice R hip    []  Heat  []  Ice massage   Rationale: decrease pain to improve the patients ability to perform ADLs    [x] Skin assessment post-treatment:  [x]intact []redness- no adverse reaction    []redness - adverse reaction:     55 min Therapeutic Exercise:  [x] See flow sheet :   Rationale: increase ROM and increase strength to improve the patients ability to perform ADLs              With   [x] TE   [] TA   [] neuro   [] other: Patient Education: [x] Review HEP    [] Progressed/Changed HEP based on:   [] positioning   [] body mechanics   [] transfers   [] heat/ice application    [] other:      Other Objective/Functional Measures: NT     Pain Level (0-10 scale) post treatment: 0/10    ASSESSMENT/Changes in Function:   Patient tolerated exercise progression well today.    Patient will continue to benefit from skilled PT services to modify and progress therapeutic interventions, address functional mobility deficits, address ROM deficits, address strength deficits, and analyze and address soft tissue restrictions to attain remaining goals. Progress towards goals / Updated goals:  Short Term Goals: To be accomplished in 2 weeks:              1. Patient will be I in HEP to promote self management of symptoms. PROGRESSING              2. Patient will report pain level at worst as less than or equal to 8/10 so they can perform ADLs without pain. PROGRESSING  Long Term Goals: To be accomplished in 4-6 weeks:              1.  Patient will report pain level at worst as less than or equal to 6/10 so they can perform ADLs without pain. 2. Patient will be able to to jog > or = 25 minutes without limitation from knee pain. 3. Patient will be able to work full shift without limitation from back pain.      PLAN  [x]  Upgrade activities as tolerated     [x]  Continue plan of care  []  Update interventions per flow sheet       []  Discharge due to:_  []  Other:_      Tee Wilde, PT 8/16/2022

## 2022-08-22 ENCOUNTER — HOSPITAL ENCOUNTER (OUTPATIENT)
Dept: PHYSICAL THERAPY | Age: 22
Discharge: HOME | End: 2022-08-22
Payer: COMMERCIAL

## 2022-08-22 PROCEDURE — 97110 THERAPEUTIC EXERCISES: CPT | Performed by: PHYSICAL THERAPIST

## 2022-08-22 NOTE — PROGRESS NOTES
PT DAILY TREATMENT NOTE - [facility] 2-15    Patient Name: Marquita Jordan  Date:2022  : 2000  [x]  Patient  Verified  Payor: Chante  / Plan: [address] / Product Type: Managed Care Medicaid /    In time: 1000 AM  Out time: 1055 AM  Total Treatment Time (min): 55  Total Timed Codes (min): 55  Visit #:  7    Treatment Area: Personal history of other diseases of the musculoskeletal system and connective tissue [Z87.39]  Pain in right knee [M25.561]  Pain in left knee [M25.562]  Other chronic pain [G89.29]    SUBJECTIVE  Pain Level (0-10 scale): 0  Any medication changes, allergies to medications, adverse drug reactions, diagnosis change, or new procedure performed?: [x] No    [] Yes (see summary sheet for update)  Subjective functional status/changes:   [] No changes reported  Patient reports that yesterday was leg day and her knees are a little sore today. They feel better than yesterday. She used the elliptical instead of the treadmill which was better on her knees. OBJECTIVE      decl min Modality:[x]  Ice R hip    []  Heat  []  Ice massage   Rationale: decrease pain to improve the patients ability to perform ADLs    [x] Skin assessment post-treatment:  [x]intact []redness- no adverse reaction    []redness - adverse reaction:     55 min Therapeutic Exercise:  [x] See flow sheet :   Rationale: increase ROM and increase strength to improve the patients ability to perform ADLs              With   [x] TE   [] TA   [] neuro   [] other: Patient Education: [x] Review HEP    [] Progressed/Changed HEP based on:   [] positioning   [] body mechanics   [] transfers   [] heat/ice application    [] other:      Other Objective/Functional Measures: NT     Pain Level (0-10 scale) post treatment: 0/10    ASSESSMENT/Changes in Function:   Progressing well with exercises and self management of symptoms.    Patient will continue to benefit from skilled PT services to modify and progress therapeutic interventions, address functional mobility deficits, address ROM deficits, address strength deficits, and analyze and address soft tissue restrictions to attain remaining goals. Progress towards goals / Updated goals:  Short Term Goals: To be accomplished in 2 weeks:              1. Patient will be I in HEP to promote self management of symptoms. PROGRESSING              2. Patient will report pain level at worst as less than or equal to 8/10 so they can perform ADLs without pain. PROGRESSING  Long Term Goals: To be accomplished in 4-6 weeks:              1.  Patient will report pain level at worst as less than or equal to 6/10 so they can perform ADLs without pain. 2. Patient will be able to to jog > or = 25 minutes without limitation from knee pain. 3. Patient will be able to work full shift without limitation from back pain.      PLAN  [x]  Upgrade activities as tolerated     [x]  Continue plan of care  []  Update interventions per flow sheet       []  Discharge due to:_  []  Other:_      Rubio Navarro, PT 8/22/2022

## 2022-08-24 ENCOUNTER — HOSPITAL ENCOUNTER (OUTPATIENT)
Dept: PHYSICAL THERAPY | Age: 22
Discharge: HOME | End: 2022-08-24
Payer: COMMERCIAL

## 2022-08-24 PROCEDURE — 97110 THERAPEUTIC EXERCISES: CPT | Performed by: PHYSICAL THERAPIST

## 2022-08-24 NOTE — PROGRESS NOTES
PT DAILY TREATMENT NOTE - [facility] 2-15    Patient Name: Marquita Jordan  Date:2022  : 2000  [x]  Patient  Verified  Payor: Chatne Cisneros / Plan: [address] / Product Type: Managed Care Medicaid /    In time: 9812 AM  Out time: 1125 AM  Total Treatment Time (min): 55  Total Timed Codes (min): 55  Visit #:  8    Treatment Area: Personal history of other diseases of the musculoskeletal system and connective tissue [Z87.39]  Pain in right knee [M25.561]  Pain in left knee [M25.562]  Other chronic pain [G89.29]    SUBJECTIVE  Pain Level (0-10 scale): 4  Any medication changes, allergies to medications, adverse drug reactions, diagnosis change, or new procedure performed?: [x] No    [] Yes (see summary sheet for update)  Subjective functional status/changes:   [] No changes reported  Patient reports that her knees have been hurting the past 2 days. She increased her weight for squats, but still not going to 90 degrees. OBJECTIVE      decl min Modality:[x]  Ice R hip    []  Heat  []  Ice massage   Rationale: decrease pain to improve the patients ability to perform ADLs    [x] Skin assessment post-treatment:  [x]intact []redness- no adverse reaction    []redness - adverse reaction:     55 min Therapeutic Exercise:  [x] See flow sheet :   Rationale: increase ROM and increase strength to improve the patients ability to perform ADLs              With   [x] TE   [] TA   [] neuro   [] other: Patient Education: [x] Review HEP    [] Progressed/Changed HEP based on:   [] positioning   [] body mechanics   [] transfers   [] heat/ice application    [] other:      Other Objective/Functional Measures: NT     Pain Level (0-10 scale) post treatment: 0/10    ASSESSMENT/Changes in Function:   Progressing well with exercise program. Discussed gradual increase in weight for squats to avoid increased strain on knees. Patient demonstrated understanding.    Patient will continue to benefit from skilled PT services to modify and progress therapeutic interventions, address functional mobility deficits, address ROM deficits, address strength deficits, and analyze and address soft tissue restrictions to attain remaining goals. Progress towards goals / Updated goals:  Short Term Goals: To be accomplished in 2 weeks:              1. Patient will be I in HEP to promote self management of symptoms. PROGRESSING              2. Patient will report pain level at worst as less than or equal to 8/10 so they can perform ADLs without pain. PROGRESSING  Long Term Goals: To be accomplished in 4-6 weeks:              1.  Patient will report pain level at worst as less than or equal to 6/10 so they can perform ADLs without pain. 2. Patient will be able to to jog > or = 25 minutes without limitation from knee pain. 3. Patient will be able to work full shift without limitation from back pain.      PLAN  [x]  Upgrade activities as tolerated     [x]  Continue plan of care  []  Update interventions per flow sheet       []  Discharge due to:_  []  Other:_      Rubio Navarro, PT 8/24/2022

## 2022-08-31 ENCOUNTER — HOSPITAL ENCOUNTER (OUTPATIENT)
Dept: PHYSICAL THERAPY | Age: 22
Discharge: HOME | End: 2022-08-31
Payer: COMMERCIAL

## 2022-08-31 PROCEDURE — 97110 THERAPEUTIC EXERCISES: CPT | Performed by: PHYSICAL THERAPIST

## 2022-08-31 NOTE — PROGRESS NOTES
PT DAILY TREATMENT NOTE/PROGRESS NOTE - [facility] 2-15    Patient Name: Cierra Lea  Date:2022  : 2000  [x]  Patient  Verified  Payor: Chante Cisneros / Plan: [address] / Product Type: Managed Care Medicaid /    In time: 8575 PM  Out time: 120 PM  Total Treatment Time (min): 55  Total Timed Codes (min): 55  Visit #:  9    Treatment Area: Personal history of other diseases of the musculoskeletal system and connective tissue [Z87.39]  Pain in right knee [M25.561]  Pain in left knee [M25.562]  Other chronic pain [G89.29]    SUBJECTIVE  Pain Level (0-10 scale): 3 current 7 worst   Any medication changes, allergies to medications, adverse drug reactions, diagnosis change, or new procedure performed?: [x] No    [] Yes (see summary sheet for update)  Subjective functional status/changes:   [] No changes reported  Patient reports 45-50% improvement in symptoms since beginning PT. She continues to have pain with running outdoors on the road secondary to knee pain and shin pain. She went for a 4 mile run outside after only running on the [employer]. She has been running about 1 - 2 miles. States that her back feels good, has some tightness sometimes but is able to alleviate it with cracking her back.      OBJECTIVE    Observation:               Squat test: Normal     ROM:      Lumbar ROM:   Flexion Flexion fingertips 4 inches from floor  Extension WNL  R SB WNL tight L side low back  L SB WNL tight R side low back      Hip PROM: B WFL     B knee ROM: WNL      Strength:      R Hip flexion 5/5  R Knee extension 5/5  R Knee flexion 5/5  R Ankle DF 5/5  R hip abduction 4/5     L Hip flexion 5/5  L Knee extension 5/5  L Knee flexion 5/5  L Ankle DF 5/5  L hip abduction 4/5         Joint mobility: hypermobile painful PA glides L1-5      Special tests:  90/90 +R, +L,      decl min Modality:[x]  Ice R hip    []  Heat  []  Ice massage   Rationale: decrease pain to improve the patients ability to perform ADLs    [x] Skin assessment post-treatment:  [x]intact []redness- no adverse reaction    []redness - adverse reaction:     55 min Therapeutic Exercise:  [x] See flow sheet :   Rationale: increase ROM and increase strength to improve the patients ability to perform ADLs              With   [x] TE   [] TA   [] neuro   [] other: Patient Education: [x] Review HEP    [] Progressed/Changed HEP based on:   [] positioning   [] body mechanics   [] transfers   [] heat/ice application    [] other:      Other Objective/Functional Measures: NT     Pain Level (0-10 scale) post treatment: 0/10    ASSESSMENT/Changes in Function:   Patient has been seen x 9 visits. She is progressing with lumbar ROM and decreased pain overall. She continues to have knee pain with exercise and would benefit from continued treatment to progress strengthening exercises and improve self management of symptoms. Patient will continue to benefit from skilled PT services to modify and progress therapeutic interventions, address functional mobility deficits, address ROM deficits, address strength deficits, and analyze and address soft tissue restrictions to attain remaining goals. Progress towards goals / Updated goals:  Short Term Goals: To be accomplished in 2 weeks:              1. Patient will be I in HEP to promote self management of symptoms. PROGRESSING              2. Patient will report pain level at worst as less than or equal to 8/10 so they can perform ADLs without pain. MET  Long Term Goals: To be accomplished in 4-6 weeks:              1.  Patient will report pain level at worst as less than or equal to 6/10 so they can perform ADLs without pain. PROGRESSING              2. Patient will be able to to jog > or = 25 minutes without limitation from knee pain. PROGRESSING              3. Patient will be able to work full shift without limitation from back pain.  MET    PLAN  [x]  Upgrade activities as tolerated     [x]  Continue plan of care 1x week x 4 weeks from 8/31/22  []  Update interventions per flow sheet       []  Discharge due to:_  []  Other:_      Lavon Syed, MACIE 8/31/2022

## 2022-09-07 ENCOUNTER — HOSPITAL ENCOUNTER (OUTPATIENT)
Dept: PHYSICAL THERAPY | Age: 22
Discharge: HOME | End: 2022-09-07
Payer: COMMERCIAL

## 2022-09-07 PROCEDURE — 97110 THERAPEUTIC EXERCISES: CPT | Performed by: PHYSICAL THERAPY ASSISTANT

## 2022-09-07 NOTE — PROGRESS NOTES
PT DAILY TREATMENT NOTE - [facility] 2-15    Patient Name: Amita Guerra  Date:2022  : 2000  [x]  Patient  Verified  Payor: Chante 22 / Plan: [address] / Product Type: Managed Care Medicaid /    In time: 9:30 AM  Out time: 10:30 AM  Total Treatment Time (min): 60  Total Timed Codes (min): 60  Visit #:  10    Treatment Area: Personal history of other diseases of the musculoskeletal system and connective tissue [Z87.39]  Pain in right knee [M25.561]  Pain in left knee [M25.562]  Other chronic pain [G89.29]    SUBJECTIVE  Pain Level (0-10 scale): 7/10  Any medication changes, allergies to medications, adverse drug reactions, diagnosis change, or new procedure performed?: [x] No    [] Yes (see summary sheet for update)  Subjective functional status/changes:   [] No changes reported  Patient reports her low back was bothering her yesterday but mainly it's her knees. States when she is at the gym she will have increased B knee pain if she increases her weight or while performing squats or agility exercises. OBJECTIVE       decl min Modality:[x]  Ice R hip    []  Heat  []  Ice massage   Rationale: decrease pain to improve the patients ability to perform ADLs    [x] Skin assessment post-treatment:  [x]intact []redness- no adverse reaction    []redness - adverse reaction:     60 min Therapeutic Exercise:  [x] See flow sheet :   Rationale: increase ROM and increase strength to improve the patients ability to perform ADLs              With   [x] TE   [] TA   [] neuro   [] other: Patient Education: [x] Review HEP    [] Progressed/Changed HEP based on:   [] positioning   [] body mechanics   [] transfers   [] heat/ice application    [] other:      Other Objective/Functional Measures: NT     Pain Level (0-10 scale) post treatment: 0/10    ASSESSMENT/Changes in Function:   Reviewed technique while squatting. Otherwise she did very well with her exercises. Balance improving.     Patient will continue to benefit from skilled PT services to modify and progress therapeutic interventions, address functional mobility deficits, address ROM deficits, address strength deficits, and analyze and address soft tissue restrictions to attain remaining goals. Progress towards goals / Updated goals:  Short Term Goals: To be accomplished in 2 weeks:              1. Patient will be I in HEP to promote self management of symptoms. PROGRESSING              2. Patient will report pain level at worst as less than or equal to 8/10 so they can perform ADLs without pain. MET  Long Term Goals: To be accomplished in 4-6 weeks:              1.  Patient will report pain level at worst as less than or equal to 6/10 so they can perform ADLs without pain. PROGRESSING              2. Patient will be able to to jog > or = 25 minutes without limitation from knee pain. PROGRESSING              3. Patient will be able to work full shift without limitation from back pain.  MET    PLAN  [x]  Upgrade activities as tolerated     [x]  Continue plan of care 1x week x 4 weeks from 8/31/22  []  Update interventions per flow sheet       []  Discharge due to:_  []  Other:_      Cady Lerma, PTA 9/7/2022

## 2022-09-08 ENCOUNTER — APPOINTMENT (OUTPATIENT)
Dept: PHYSICAL THERAPY | Age: 22
End: 2022-09-08
Payer: COMMERCIAL

## 2022-09-12 ENCOUNTER — HOSPITAL ENCOUNTER (OUTPATIENT)
Dept: PHYSICAL THERAPY | Age: 22
Discharge: HOME | End: 2022-09-12
Payer: COMMERCIAL

## 2022-09-12 PROCEDURE — 97110 THERAPEUTIC EXERCISES: CPT | Performed by: PHYSICAL THERAPY ASSISTANT

## 2022-09-12 NOTE — PROGRESS NOTES
PT DAILY TREATMENT NOTE - [facility] 2-15    Patient Name: Jose Alfredo Stokes  Date:2022  : 2000  [x]  Patient  Verified  Payor: Sisi 22 / Plan: [address] / Product Type: Managed Care Medicaid /    In time: 1:30 PM  Out time: 2:25 PM  Total Treatment Time (min): 55  Total Timed Codes (min): 55  Visit #:  11    Treatment Area: Pain in right knee [M25.561]  Pain in left knee [M25.562]  Back pain [M54.9]  Bilateral knee pain [M25.561, M25.562]    SUBJECTIVE  Pain Level (0-10 scale): 0/10  Any medication changes, allergies to medications, adverse drug reactions, diagnosis change, or new procedure performed?: [x] No    [] Yes (see summary sheet for update)  Subjective functional status/changes:   [] No changes reported  Patient states she took a break from the gym and rested and that has really helped. She went back to the gym today and decreased her weights and reps and hasn't had any pain. OBJECTIVE       decl min Modality:[x]  Ice R hip    []  Heat  []  Ice massage   Rationale: decrease pain to improve the patients ability to perform ADLs    [x] Skin assessment post-treatment:  [x]intact []redness- no adverse reaction    []redness - adverse reaction:     55 min Therapeutic Exercise:  [x] See flow sheet :   Rationale: increase ROM and increase strength to improve the patients ability to perform ADLs              With   [x] TE   [] TA   [] neuro   [] other: Patient Education: [x] Review HEP    [] Progressed/Changed HEP based on:   [] positioning   [] body mechanics   [] transfers   [] heat/ice application    [] other:      Other Objective/Functional Measures: NT     Pain Level (0-10 scale) post treatment: 0/10    ASSESSMENT/Changes in Function:   Challenged with addition of Shahana Disc during bird dogs but able to maintain neutral lumbar spine. Good tolerance to exercises.    Patient will continue to benefit from skilled PT services to modify and progress therapeutic interventions, address functional mobility deficits, address ROM deficits, address strength deficits, and analyze and address soft tissue restrictions to attain remaining goals. Progress towards goals / Updated goals:  Short Term Goals: To be accomplished in 2 weeks:              1. Patient will be I in HEP to promote self management of symptoms. PROGRESSING              2. Patient will report pain level at worst as less than or equal to 8/10 so they can perform ADLs without pain. MET  Long Term Goals: To be accomplished in 4-6 weeks:              1.  Patient will report pain level at worst as less than or equal to 6/10 so they can perform ADLs without pain. PROGRESSING              2. Patient will be able to to jog > or = 25 minutes without limitation from knee pain. PROGRESSING              3. Patient will be able to work full shift without limitation from back pain.  MET    PLAN  [x]  Upgrade activities as tolerated     [x]  Continue plan of care 1x week x 4 weeks from 8/31/22  []  Update interventions per flow sheet       []  Discharge due to:_  []  Other:_      Waldemar Bautista, PTA 9/12/2022

## 2022-09-16 DIAGNOSIS — F41.9 ANXIETY AND DEPRESSION: ICD-10-CM

## 2022-09-16 DIAGNOSIS — F32.A ANXIETY AND DEPRESSION: ICD-10-CM

## 2022-09-16 RX ORDER — SERTRALINE HYDROCHLORIDE 50 MG/1
TABLET, FILM COATED ORAL
Qty: 30 TABLET | Refills: 1 | Status: SHIPPED | OUTPATIENT
Start: 2022-09-16

## 2022-09-16 NOTE — TELEPHONE ENCOUNTER
PCP: Brandt Canavan, NP    Last appt: 8/12/2022  Future Appointments   Date Time Provider Taco Kendrick   9/23/2022 11:00 AM David Florez PTA [facility] RE       Requested Prescriptions     Pending Prescriptions Disp Refills    sertraline (ZOLOFT) 50 mg tablet 30 Tablet 1     Sig: Take 1/2 tablet for 2 weeks. After 2 weeks, if well tolerated,  take 1 tablet daily.          Other Comments:

## 2022-09-23 ENCOUNTER — HOSPITAL ENCOUNTER (OUTPATIENT)
Dept: PHYSICAL THERAPY | Age: 22
Discharge: HOME | End: 2022-09-23
Payer: COMMERCIAL

## 2022-09-23 PROCEDURE — 97110 THERAPEUTIC EXERCISES: CPT | Performed by: PHYSICAL THERAPY ASSISTANT

## 2022-09-23 NOTE — PROGRESS NOTES
PT DAILY TREATMENT NOTE - [facility] 2-15    Patient Name: Jose Alfredo Stokes  Date:2022  : 2000  [x]  Patient  Verified  Payor: Sisi 22 / Plan: [address] / Product Type: Managed Care Medicaid /    In time: 10:45 AM  Out time: 11:40 AM  Total Treatment Time (min): 55  Total Timed Codes (min): 55  Visit #:  12    Treatment Area: Pain in right knee [M25.561]  Pain in left knee [M25.562]  Back pain [M54.9]  Bilateral knee pain [M25.561, M25.562]    SUBJECTIVE  Pain Level (0-10 scale): 0/10  Any medication changes, allergies to medications, adverse drug reactions, diagnosis change, or new procedure performed?: [x] No    [] Yes (see summary sheet for update)  Subjective functional status/changes:   [] No changes reported  Patient states she was a little sore after last visit but overall has been doing very well. OBJECTIVE       decl min Modality:[x]  Ice R hip    []  Heat  []  Ice massage   Rationale: decrease pain to improve the patients ability to perform ADLs    [x] Skin assessment post-treatment:  [x]intact []redness- no adverse reaction    []redness - adverse reaction:     55 min Therapeutic Exercise:  [x] See flow sheet :   Rationale: increase ROM and increase strength to improve the patients ability to perform ADLs              With   [x] TE   [] TA   [] neuro   [] other: Patient Education: [x] Review HEP    [] Progressed/Changed HEP based on:   [] positioning   [] body mechanics   [] transfers   [] heat/ice application    [] other:      Other Objective/Functional Measures: NT     Pain Level (0-10 scale) post treatment: 0/10    ASSESSMENT/Changes in Function:   Tolerated SWB walkout/hold well today. Progressing well with core/LE strength.   Patient will continue to benefit from skilled PT services to modify and progress therapeutic interventions, address functional mobility deficits, address ROM deficits, address strength deficits, and analyze and address soft tissue restrictions to attain remaining goals. Progress towards goals / Updated goals:  Short Term Goals: To be accomplished in 2 weeks:              1. Patient will be I in HEP to promote self management of symptoms. PROGRESSING              2. Patient will report pain level at worst as less than or equal to 8/10 so they can perform ADLs without pain. MET  Long Term Goals: To be accomplished in 4-6 weeks:              1.  Patient will report pain level at worst as less than or equal to 6/10 so they can perform ADLs without pain. PROGRESSING              2. Patient will be able to to jog > or = 25 minutes without limitation from knee pain. PROGRESSING              3. Patient will be able to work full shift without limitation from back pain.  MET    PLAN  [x]  Upgrade activities as tolerated     [x]  Continue plan of care 1x week x 4 weeks from 8/31/22  []  Update interventions per flow sheet       []  Discharge due to:_  []  Other:_      Bonnetta Habermann, PTA 9/23/2022

## 2022-09-27 ENCOUNTER — APPOINTMENT (OUTPATIENT)
Dept: PHYSICAL THERAPY | Age: 22
End: 2022-09-27
Payer: COMMERCIAL

## 2022-10-14 ENCOUNTER — HOSPITAL ENCOUNTER (OUTPATIENT)
Dept: PHYSICAL THERAPY | Age: 22
Discharge: HOME | End: 2022-10-14
Payer: COMMERCIAL

## 2022-10-14 PROCEDURE — 97110 THERAPEUTIC EXERCISES: CPT | Performed by: PHYSICAL THERAPY ASSISTANT

## 2022-10-14 NOTE — PROGRESS NOTES
PT DAILY TREATMENT NOTE - [facility] 2-15    Patient Name: Eder Tovar  Date:10/14/2022  : 2000  [x]  Patient  Verified  Payor: Chante Cisneros / Plan: [address] / Product Type: Managed Care Medicaid /    In time: 7:25 AM  Out time: 8:30 AM  Total Treatment Time (min): 65  Total Timed Codes (min): 65  Visit #:  13    Treatment Area: Pain in right knee [M25.561]  Pain in left knee [M25.562]  Back pain [M54.9]  Bilateral knee pain [M25.561, M25.562]    SUBJECTIVE  Pain Level (0-10 scale): 0/10  Any medication changes, allergies to medications, adverse drug reactions, diagnosis change, or new procedure performed?: [x] No    [] Yes (see summary sheet for update)  Subjective functional status/changes:   [] No changes reported  Patient states overall she is doing well however she will have low back pain intermittently after increased activity. States her knees are doing very well.  She would like to try on her own for a few weeks and if she has increased symptoms then she will return to PT.     OBJECTIVE  Observation:               Squat test: Normal     ROM:      Lumbar ROM:   Flexion Flexion fingertips to Toes  Extension WNL mild pain with extension  R SB WNL tight L side low back  L SB WNL tight R side low back      Hip PROM: B WFL     B knee ROM: WNL      Strength:      R Hip flexion 5/5  R Knee extension 5/5  R Knee flexion 5/5  R Ankle DF 5/5  R hip abduction 4/5     L Hip flexion 5/5  L Knee extension 5/5  L Knee flexion 5/5  L Ankle DF 5/5  L hip abduction 4+/5         Joint mobility: hypermobile PA glides L1-5      Special tests:  90/90 +R, +L,      decl min Modality:[x]  Ice R hip    []  Heat  []  Ice massage   Rationale: decrease pain to improve the patients ability to perform ADLs    [x] Skin assessment post-treatment:  [x]intact []redness- no adverse reaction    []redness - adverse reaction:     65 min Therapeutic Exercise:  [x] See flow sheet : extensively reviewed workout/gym routine with emphasis on neutral lumbar spine during squats, push ups (modified to incline on bench vs floor due to excessive lumbar lordosis, educated patient on progression of pushups) also educated on supportive vs or OTC inserts vs non supportive shoes and how it impacts her knees and low back especially with prolonged standing at work. Rationale: increase ROM and increase strength to improve the patients ability to perform ADLs              With   [x] TE   [] TA   [] neuro   [] other: Patient Education: [x] Review HEP    [] Progressed/Changed HEP based on:   [] positioning   [] body mechanics   [] transfers   [] heat/ice application    [] other:      Other Objective/Functional Measures: NT     Pain Level (0-10 scale) post treatment: 0/10    ASSESSMENT/Changes in Function:   Patient demonstrates improved lumbar AROM, increased LE strength, improved balance/proprioception and overall decreased pain levels with functional activities. We have reviewed her gym regimen and gave recommendations for maintaining neutral lumbar spine with strengthening. Patient very compliant with HEP. She has met 2/2 STG's and 2/3 LTG's. Patient to try on her own but will return if symptoms increase. Patient will continue to benefit from skilled PT services to modify and progress therapeutic interventions, address functional mobility deficits, address ROM deficits, address strength deficits, and analyze and address soft tissue restrictions to attain remaining goals. Progress towards goals / Updated goals:  Short Term Goals: To be accomplished in 2 weeks:              1. Patient will be I in HEP to promote self management of symptoms. MET              2. Patient will report pain level at worst as less than or equal to 8/10 so they can perform ADLs without pain. MET  Long Term Goals:  To be accomplished in 4-6 weeks:              1.  Patient will report pain level at worst as less than or equal to 6/10 so they can perform ADLs without pain. MEt              2. Patient will be able to to jog > or = 25 minutes without limitation from knee pain. NA              3. Patient will be able to work full shift without limitation from back pain. MET    PLAN  []  Upgrade activities as tolerated     []  Continue plan of care 1  []  Update interventions per flow sheet       []  Discharge due to:_  [x]  Other:therapist to leave chart open for 1 month from 10/14/22 as patient to manage on own but will return to see primary therapist is symptoms increase.        Jesús Tian, PTA 10/14/2022

## 2022-11-14 ENCOUNTER — TELEPHONE (OUTPATIENT)
Dept: OBGYN CLINIC | Age: 22
End: 2022-11-14

## 2022-11-14 DIAGNOSIS — F41.9 ANXIETY AND DEPRESSION: ICD-10-CM

## 2022-11-14 DIAGNOSIS — F32.A ANXIETY AND DEPRESSION: ICD-10-CM

## 2022-11-14 RX ORDER — SERTRALINE HYDROCHLORIDE 50 MG/1
TABLET, FILM COATED ORAL
Qty: 30 TABLET | Refills: 1 | Status: SHIPPED | OUTPATIENT
Start: 2022-11-14

## 2022-11-14 NOTE — TELEPHONE ENCOUNTER
25year old patient last seen in the office on 1/6/2022 for ae    Patient calling about needing a refill of her ocp    This nurse attempted to reach the patient and left a detailed message that she should check with the pharmacy that she should have refills left

## 2022-11-30 NOTE — ANCILLARY DISCHARGE INSTRUCTIONS
Physical Therapy at [facility],   a part of [address] [place] [address] Drive  [address] [place] [address], [facility] [address]  Phone: [phone]  Fax: [phone]    Discharge Summary  2-15    Patient name: Nelson Douglas  : 2000  Provider#:[id number]  Referral source: Jared Morillo NP      Medical/Treatment Diagnosis: Pain in right knee [M25.561]  Pain in left knee [M25.562]  Back pain [M54.9]  Bilateral knee pain [M25.561, M25.562]     Prior Hospitalization: see medical history     Comorbidities: none  Prior Level of Function:Able to run without knee pain prior to 1 year ago, longstanding history of back pain   Medications: Verified on Patient Summary List    Start of Care: 22      Onset Date:several years   Visits from Start of Care: 13     Missed Visits: 0  Reporting Period : 22 to 10/14/22    Short Term Goals: To be accomplished in 2 weeks:              1. Patient will be I in HEP to promote self management of symptoms. MET              2. Patient will report pain level at worst as less than or equal to 8/10 so they can perform ADLs without pain. MET  Long Term Goals: To be accomplished in 4-6 weeks:              1.  Patient will report pain level at worst as less than or equal to 6/10 so they can perform ADLs without pain. MEt              2. Patient will be able to to jog > or = 25 minutes without limitation from knee pain. NA              3. Patient will be able to work full shift without limitation from back pain. MET         ASSESSMENT/SUMMARY OF CARE: Patient was seen x 13 visits. She progressed toward goals set at initial evaluation.      RECOMMENDATIONS:  [x]Discontinue therapy: [x]Patient has reached or is progressing toward set goals      []Patient is non-compliant or has abdicated      []Due to lack of appreciable progress towards set goals    Aubrie Gutierrez, PT 2022

## 2022-12-06 ENCOUNTER — OFFICE VISIT (OUTPATIENT)
Dept: PRIMARY CARE CLINIC | Age: 22
End: 2022-12-06
Payer: COMMERCIAL

## 2022-12-06 VITALS
WEIGHT: 153 LBS | RESPIRATION RATE: 18 BRPM | BODY MASS INDEX: 24.01 KG/M2 | SYSTOLIC BLOOD PRESSURE: 129 MMHG | TEMPERATURE: 97.8 F | HEIGHT: 67 IN | OXYGEN SATURATION: 99 % | DIASTOLIC BLOOD PRESSURE: 83 MMHG | HEART RATE: 77 BPM

## 2022-12-06 DIAGNOSIS — F41.9 ANXIETY AND DEPRESSION: Primary | ICD-10-CM

## 2022-12-06 DIAGNOSIS — G89.29 CHRONIC MIDLINE LOW BACK PAIN WITHOUT SCIATICA: ICD-10-CM

## 2022-12-06 DIAGNOSIS — F32.A ANXIETY AND DEPRESSION: Primary | ICD-10-CM

## 2022-12-06 DIAGNOSIS — Z11.3 SCREENING FOR STD (SEXUALLY TRANSMITTED DISEASE): ICD-10-CM

## 2022-12-06 DIAGNOSIS — M25.561 CHRONIC PAIN OF BOTH KNEES: ICD-10-CM

## 2022-12-06 DIAGNOSIS — M25.562 CHRONIC PAIN OF BOTH KNEES: ICD-10-CM

## 2022-12-06 DIAGNOSIS — M54.50 CHRONIC MIDLINE LOW BACK PAIN WITHOUT SCIATICA: ICD-10-CM

## 2022-12-06 DIAGNOSIS — G89.29 CHRONIC PAIN OF BOTH KNEES: ICD-10-CM

## 2022-12-06 PROCEDURE — 99214 OFFICE O/P EST MOD 30 MIN: CPT

## 2022-12-06 RX ORDER — SERTRALINE HYDROCHLORIDE 100 MG/1
100 TABLET, FILM COATED ORAL DAILY
Qty: 90 TABLET | Refills: 0 | Status: SHIPPED | OUTPATIENT
Start: 2022-12-06

## 2022-12-06 NOTE — PROGRESS NOTES
Identified pt with two pt identifiers(name and ). Chief Complaint   Patient presents with    Follow-up        3 most recent PHQ Screens 2022   Little interest or pleasure in doing things Not at all   Feeling down, depressed, irritable, or hopeless Several days   Total Score PHQ 2 1   Trouble falling or staying asleep, or sleeping too much -   Feeling tired or having little energy -   Poor appetite, weight loss, or overeating -   Feeling bad about yourself - or that you are a failure or have let yourself or your family down -   Trouble concentrating on things such as school, work, reading, or watching TV -   Moving or speaking so slowly that other people could have noticed; or the opposite being so fidgety that others notice -   Thoughts of being better off dead, or hurting yourself in some way -   PHQ 9 Score -   How difficult have these problems made it for you to do your work, take care of your home and get along with others -        Vitals:    22 1153   BP: 129/83   Pulse: 77   Resp: 18   Temp: 97.8 °F (36.6 °C)   TempSrc: Temporal   SpO2: 99%   Weight: 153 lb (69.4 kg)   Height: 5' 7\" (1.702 m)   PainSc:   4   PainLoc: Back       Health Maintenance Due   Topic Date Due    COVID-19 Vaccine (3 - Booster for Moderna series) 07/10/2021    Flu Vaccine (1) 2022        1. Have you been to the ER, urgent care clinic since your last visit? Hospitalized since your last visit? No    2. Have you seen or consulted any other health care providers outside of the [address] [place] [address] since your last visit? Include any pap smears or colon screening. No    3. For patients aged 39-70: Has the patient had a colonoscopy / FIT/ Cologuard? NA - based on age      If the patient is female:    4. For patients aged 41-77: Has the patient had a mammogram within the past 2 years? NA - based on age or sex      11. For patients aged 21-65: Has the patient had a pap smear?  2022

## 2022-12-06 NOTE — PROGRESS NOTES
[facility]   Mik Britt.Prince, [address] [facility]  P: [phone]  F: [phone]    SUBJECTIVE     HPI:     Rom Lu is a 25 y.o. female who is seen in the clinic for   Chief Complaint   Patient presents with    Follow-up      Established patient here for follow up. She recently finished PT for chronic pain in both knees and mid lower back. She states things are going \"ok\" since finished PT. Back pain is more bothersome than knee pain. When back pain flares it lasts for 2 days and then resolves. Anxiety and depression: interested in counseling but has not established care yet with a counselor. Previously worked with a counselor from the [facility], sessions recently finished. She has previously struggled with body dysmorphia and has noticed some of her unhealthy ways of thinking about food have returned. No purging, binging reported by patient. She also feels like her Zoloft is not as effective as it was previously. Has a hectic schedule with school and work but reports managing stress well. She is requesting to be screened for sexually transmitted diseases. She does not currently have symptoms. Patient Active Problem List    Diagnosis    Overweight (BMI 25.0-29. 9)        Past Medical History:   Diagnosis Date    Anemia 10/7/2021    Depression      Past Surgical History:   Procedure Laterality Date    HX WISDOM TEETH EXTRACTION       Social History     Socioeconomic History    Marital status: SINGLE     Spouse name: Not on file    Number of children: Not on file    Years of education: Not on file    Highest education level: Not on file   Occupational History    Not on file   Tobacco Use    Smoking status: Never    Smokeless tobacco: Never   Vaping Use    Vaping Use: Never used   Substance and Sexual Activity    Alcohol use:  Yes     Alcohol/week: 20.0 standard drinks     Types: 3 Cans of beer, 11 Shots of liquor, 6 Standard drinks or equivalent per week Comment: Not regularly    Drug use: Yes     Frequency: 3.0 times per week     Types: Marijuana     Comment: occasionally    Sexual activity: Yes     Partners: Male     Birth control/protection: None     Comment: Oral only   Other Topics Concern    Not on file   Social History Narrative    ** Merged History Encounter **          Social Determinants of Health     Financial Resource Strain: Not on file   Food Insecurity: Not on file   Transportation Needs: Not on file   Physical Activity: Sufficiently Active    Days of Exercise per Week: 7 days    Minutes of Exercise per Session: 90 min   Stress: Not on file   Social Connections: Not on file   Intimate Partner Violence: Unknown    Fear of Current or Ex-Partner: Patient refused    Emotionally Abused: Patient refused    Physically Abused: Patient refused    Sexually Abused: Patient refused   Housing Stability: Not on file     Family History   Problem Relation Age of Onset    No Known Problems Mother     Hypertension Father     Elevated Lipids Father     Diabetes Maternal Grandmother     Hypertension Maternal Grandmother     Cancer Maternal Grandfather         liver cancer    Cancer Paternal Grandmother         unknown    Heart Attack Paternal Grandfather     Heart Disease Paternal Grandfather     Breast Cancer Paternal Aunt      Immunization History   Administered Date(s) Administered    COVID-19, MODERNA BLUE border, Primary or Immunocompromised, (age 18y+), IM, 100 mcg/0.5mL 04/17/2021, 05/15/2021    DTaP 08/12/2004    HPV (9-valent) 12/17/2018, 02/21/2019, 06/27/2019    Influenza Vaccine 12/16/2016, 11/28/2017    Influenza, FLUARIX, FLULAVAL, [address] (age 10 mo+) AND AFLURIA, (age 1 y+), PF, 0.5mL 11/23/2018, 10/19/2021    MMR 08/12/2004    Meningococcal ACWY Vaccine 08/08/2018    Poliovirus vaccine 08/12/2004    Tdap 08/21/2015    Varicella Virus Vaccine 07/25/2013      No Known Allergies    No visits with results within 3 Month(s) from this visit.    Latest known visit with results is:   Office Visit on 08/12/2022   Component Date Value Ref Range Status    VALID INTERNAL CONTROL POC 08/12/2022 Yes   Corrected    Group A Strep Ag 08/12/2022 Negative  Negative Corrected      MRI LUMB SPINE WO CONT  Narrative: EXAM:  MRI LUMB SPINE WO CONT    HISTORY: Low back pain  INDICATION:  Low back pain. COMPARISON: None    TECHNIQUE: MR imaging of the lumbar spine was performed using the following  sequences: sagittal T1, T2, STIR;  axial T1, T2.     CONTRAST:  None. FINDINGS:    There is normal alignment of the lumbar spine. Vertebral body heights are  maintained. There is no spondylolysis or spondylolisthesis. No pedicle marrow  edema. The conus medullaris terminates at T12-L1. Signal and caliber of the distal  spinal cord are within normal limits. The paraspinal soft tissues are within normal limits. Lower thoracic spine: No herniation or stenosis. L1-L2:  No herniation or stenosis. L2-L3:  No herniation or stenosis. L3-L4:  No herniation or stenosis. L4-L5:  Mild broad-based protrusion. Mild to moderate canal stenosis. Foramina  are patent. L5-S1:  No herniation or stenosis. Impression: IMPRESSION:  Disc protrusion at L4-5 with mild to moderate canal stenosis at L4-5. No  foraminal stenosis. There is no evidence of fracture or dislocation. No spondylolysis or  spondylolisthesis. Current Outpatient Medications   Medication Sig Dispense Refill    sertraline (ZOLOFT) 50 mg tablet TAKE 1/2 TABLET FOR 2 WEEKS. AFTER 2 WEEKS, IF WELL TOLERATED, TAKE 1 TABLET DAILY. 30 Tablet 1    diclofenac (VOLTAREN) 1 % gel Apply  to affected area as needed for Pain. 1 Each 1    norethindrone-ethinyl estradiol (JUNEL FE 1/20) 1 mg-20 mcg (21)/75 mg (7) tab Take  by mouth. ibuprofen (MOTRIN) 200 mg tablet Take 200 mg by mouth every six (6) hours as needed for Pain.       ferrous sulfate (IRON) 325 mg (65 mg iron) EC tablet Take 1 Tablet by mouth two (2) times daily (with meals). (Patient not taking: No sig reported) 60 Tablet 3           The past medical history, past surgical history, and family history were reviewed and updated in the medical record. Lab values/Imaging were reviewed. The medications were reviewed and updated in the medical record. Immunizations were reviewed and updated in the medical record. All relevant preventative screenings reviewed and updated in the medical record. REVIEW OF SYSTEMS   Review of Systems   Constitutional:  Negative for chills, diaphoresis, fever, malaise/fatigue and weight loss. Respiratory:  Negative for cough, shortness of breath and wheezing. Cardiovascular:  Negative for chest pain, palpitations and leg swelling. Gastrointestinal:  Negative for constipation, diarrhea, nausea and vomiting. Musculoskeletal:  Positive for back pain and joint pain. Negative for myalgias and neck pain. Neurological:  Negative for headaches. Psychiatric/Behavioral:  Positive for depression. Negative for suicidal ideas. The patient is nervous/anxious. PHYSICAL EXAM   /83 (BP 1 Location: Left upper arm, BP Patient Position: Sitting, BP Cuff Size: Adult)   Pulse 77   Temp 97.8 °F (36.6 °C) (Temporal)   Resp 18   Ht 5' 7\" (1.702 m)   Wt 153 lb (69.4 kg)   SpO2 99%   BMI 23.96 kg/m²      Physical Exam  Vitals and nursing note reviewed. Constitutional:       General: She is not in acute distress. Appearance: She is normal weight. She is not toxic-appearing. Cardiovascular:      Rate and Rhythm: Normal rate and regular rhythm. Pulses: Normal pulses. Heart sounds: Normal heart sounds. No murmur heard. Pulmonary:      Effort: Pulmonary effort is normal. No respiratory distress. Breath sounds: Normal breath sounds. No wheezing. Skin:     General: Skin is warm and dry. Neurological:      General: No focal deficit present.       Mental Status: She is alert and oriented to person, place, and time. Mental status is at baseline. Cranial Nerves: No cranial nerve deficit. Sensory: No sensory deficit. Motor: No weakness. Psychiatric:         Mood and Affect: Mood normal.         Behavior: Behavior normal.         Thought Content: Thought content normal.         Judgment: Judgment normal.          ASSESSMENT/ PLAN   Below is the assessment and plan developed based on review of pertinent history, physical exam, labs, studies, and medications. 1. Anxiety and depression  -     Discussed option of changing SSRI vs increasing dose of current SSRI. Following this we decided to increase dose of her sertraline from 50mg to 100mg daily.  - No SI, HI  - Provided with information regarding local Psychologists, Counselors, including local crisis line  - sertraline (ZOLOFT) 100 mg tablet; Take 1 Tablet by mouth daily. , Normal, Disp-90 Tablet, R-0  2. Screening for STD (sexually transmitted disease)  -     Currently asymptomatic. On OCP prescribed by OB-GYN.  - HIV 1/2 AG/AB, 4TH GENERATION,W RFLX CONFIRM; Future  -     HSV 1 AND 2-SPEC AB, IGG W/RFX TO SUPPL HSV-2 TESTING; Future  -     RPR; Future  -     CT+NG+M GENITALIUM BY ZEB, UR; Future  -     HEPATITIS PANEL, ACUTE; Future  3. Chronic pain of both knees  - Recently finished PT. Pain has improved. Can take OTC medication such as tylenol, ibuprofen PRN  4. Chronic midline low back pain without sciatica   - Recently finished PT. Pain has improved. Can take OTC medication such as tylenol, ibuprofen PRN. RTC in 4-6 weeks to discuss response to increased dose of sertraline. Disclaimer:  Advised patient to call back or return to office if symptoms worsen/change/persist.  Discussed expected course/resolution/complications of diagnosis in detail with patient. Medication risks/benefits/alternatives discussed with patient. Patient was given an after visit summary which includes diagnoses, current medications, & vitals.       Discussed patient instructions and advised to read to all patient instructions regarding care. Patient expressed understanding with the diagnosis and plan.        Justin Ogden NP  12/6/2022

## 2022-12-13 ENCOUNTER — TELEPHONE (OUTPATIENT)
Dept: PRIMARY CARE CLINIC | Age: 22
End: 2022-12-13

## 2022-12-13 DIAGNOSIS — A74.9 CHLAMYDIA: Primary | ICD-10-CM

## 2022-12-13 RX ORDER — DOXYCYCLINE 100 MG/1
100 CAPSULE ORAL 2 TIMES DAILY
Qty: 14 CAPSULE | Refills: 0 | Status: SHIPPED | OUTPATIENT
Start: 2022-12-13 | End: 2022-12-20

## 2022-12-13 NOTE — TELEPHONE ENCOUNTER
Patient would like to talk to [facility] or her nurse. She wouldn't say why.

## 2022-12-13 NOTE — TELEPHONE ENCOUNTER
Called patient back and informed patient about information stated in previous conversation with Erica

## 2022-12-22 DIAGNOSIS — Z11.3 SCREENING FOR STDS (SEXUALLY TRANSMITTED DISEASES): Primary | ICD-10-CM

## 2023-01-17 ENCOUNTER — OFFICE VISIT (OUTPATIENT)
Dept: PRIMARY CARE CLINIC | Age: 23
End: 2023-01-17
Payer: COMMERCIAL

## 2023-01-17 VITALS
TEMPERATURE: 97.8 F | BODY MASS INDEX: 23.95 KG/M2 | WEIGHT: 152.6 LBS | RESPIRATION RATE: 18 BRPM | DIASTOLIC BLOOD PRESSURE: 77 MMHG | SYSTOLIC BLOOD PRESSURE: 119 MMHG | HEART RATE: 85 BPM | OXYGEN SATURATION: 99 % | HEIGHT: 67 IN

## 2023-01-17 DIAGNOSIS — R19.7 DIARRHEA, UNSPECIFIED TYPE: ICD-10-CM

## 2023-01-17 DIAGNOSIS — K59.00 CONSTIPATION, UNSPECIFIED CONSTIPATION TYPE: ICD-10-CM

## 2023-01-17 DIAGNOSIS — N92.6 MISSED PERIOD: ICD-10-CM

## 2023-01-17 DIAGNOSIS — Z11.3 ROUTINE SCREENING FOR STI (SEXUALLY TRANSMITTED INFECTION): Primary | ICD-10-CM

## 2023-01-17 DIAGNOSIS — F41.9 ANXIETY AND DEPRESSION: ICD-10-CM

## 2023-01-17 DIAGNOSIS — R42 DIZZINESS: ICD-10-CM

## 2023-01-17 DIAGNOSIS — F32.A ANXIETY AND DEPRESSION: ICD-10-CM

## 2023-01-17 PROCEDURE — 99214 OFFICE O/P EST MOD 30 MIN: CPT

## 2023-01-17 RX ORDER — BUSPIRONE HYDROCHLORIDE 10 MG/1
10 TABLET ORAL 2 TIMES DAILY
Qty: 60 TABLET | Refills: 1 | Status: SHIPPED | OUTPATIENT
Start: 2023-01-17

## 2023-01-17 RX ORDER — SERTRALINE HYDROCHLORIDE 100 MG/1
100 TABLET, FILM COATED ORAL DAILY
Qty: 90 TABLET | Refills: 0 | Status: SHIPPED | OUTPATIENT
Start: 2023-01-17

## 2023-01-17 NOTE — PROGRESS NOTES
[facility]   [address], [address] [facility]  P: [phone]  F: [phone]    SUBJECTIVE     HPI:     Ant Whitfield is a 25 y.o. female who is seen in the clinic for   Chief Complaint   Patient presents with    Labs     Would recheck of Std testing          She is an established patient here for STI testing. 12/6/2022 she tested positive for chlamydia. She was treated with doxycycline 100mg BID for one week. She returned to check that STI was resolved but the lab clinician canceled the urine STI test order and angelia blood instead. As the original order was for urine testing, the lab was not sent for testing. She is asking for the labs to be redrawn and sent. She is asking to be retested for STI's as she has had another potential exposure. Also, she reports she missed her period last month, had two days of spotting but not her normal period. She denies symptoms of STIs. She has a history of anxiety and depression. She states Zoloft is working well for her but she still feels anxious sometimes. Wondering if there is something else she can take for this. She c/o episodes of dizziness that started about 1 month ago. One episode of dizziness was severe enough that she had to lay on the kitchen floor for it to resolve. No vertigo, headache, nausea, syncope associated. Unsure if these     She c/o constipation and diarrhea, equal in frequency/severity. She is physically active at the gym, works out most days, and stays well hydrated. No food triggers, bleeding when wiping, dysuria, abdominal pain/cramping, or increased stress recently. Patient Active Problem List    Diagnosis    Overweight (BMI 25.0-29. 9)        Past Medical History:   Diagnosis Date    Anemia 10/7/2021    Depression      Past Surgical History:   Procedure Laterality Date    HX WISDOM TEETH EXTRACTION       Social History     Socioeconomic History    Marital status: SINGLE     Spouse name: Not on file    Number of children: Not on file    Years of education: Not on file    Highest education level: Not on file   Occupational History    Not on file   Tobacco Use    Smoking status: Never    Smokeless tobacco: Never   Vaping Use    Vaping Use: Never used   Substance and Sexual Activity    Alcohol use:  Yes     Alcohol/week: 20.0 standard drinks     Types: 3 Cans of beer, 11 Shots of liquor, 6 Standard drinks or equivalent per week     Comment: Not regularly    Drug use: Yes     Frequency: 3.0 times per week     Types: Marijuana     Comment: occasionally    Sexual activity: Yes     Partners: Male     Birth control/protection: None     Comment: Oral only   Other Topics Concern    Not on file   Social History Narrative    ** Merged History Encounter **          Social Determinants of Health     Financial Resource Strain: Not on file   Food Insecurity: Not on file   Transportation Needs: Not on file   Physical Activity: Sufficiently Active    Days of Exercise per Week: 7 days    Minutes of Exercise per Session: 90 min   Stress: Not on file   Social Connections: Not on file   Intimate Partner Violence: Unknown    Fear of Current or Ex-Partner: Patient refused    Emotionally Abused: Patient refused    Physically Abused: Patient refused    Sexually Abused: Patient refused   Housing Stability: Not on file     Family History   Problem Relation Age of Onset    No Known Problems Mother     Hypertension Father     Elevated Lipids Father     Diabetes Maternal Grandmother     Hypertension Maternal Grandmother     Cancer Maternal Grandfather         liver cancer    Cancer Paternal Grandmother         unknown    Heart Attack Paternal Grandfather     Heart Disease Paternal Grandfather     Breast Cancer Paternal Aunt      Immunization History   Administered Date(s) Administered    COVID-19, MODERNA BLUE border, Primary or Immunocompromised, (age 18y+), IM, 100 mcg/0.5mL 04/17/2021, 05/15/2021    DTaP 08/12/2004    HPV (9-valent) 12/17/2018, 02/21/2019, 06/27/2019    Influenza Vaccine 12/16/2016, 11/28/2017    Influenza, FLUARIX, FLULAVAL, [address] (age 10 mo+) AND AFLURIA, (age 1 y+), PF, 0.5mL 11/23/2018, 10/19/2021    MMR 08/12/2004    Meningococcal ACWY Vaccine 08/08/2018    Poliovirus vaccine 08/12/2004    Tdap 08/21/2015    Varicella Virus Vaccine 07/25/2013      No Known Allergies    Orders Only on 12/22/2022   Component Date Value Ref Range Status    SAMPLES BEING HELD 12/22/2022 APTIMA   Final    COMMENT 12/22/2022 Add-on orders for these samples will be processed based on acceptable specimen integrity and analyte stability, which may vary by analyte. Final   Orders Only on 12/06/2022   Component Date Value Ref Range Status    RPR 12/06/2022 NONREACTIVE  NONREACTIVE   Final    HSV 1 Ab, IgG, type spec. 12/06/2022 <0.91  0.00 - 0.90 index Final    Comment: (NOTE)                                  Negative        <0.91                                  Equivocal 0.91 - 1.09                                  Positive        >1.09  Note: Negative indicates no antibodies detected to  HSV-1. Equivocal may suggest early infection. If  clinically appropriate, retest at later date. Positive  indicates antibodies detected to HSV-1.      HSV 2 Ab IgG, type spec. 12/06/2022 4.99 (A)  0.00 - 0.90 index Final    Comment: (NOTE)                                  Negative        <0.91                                  Equivocal 0.91 - 1.09                                  Positive        >1.09  Note: Negative indicates no HSV-2 antibodies detected. Positive indicates HSV-2 antibodies detected. Equivocal and low positive HSV-2 screens  (Index 0.91-5.00) may be false positive and are  reflexed to supplemental testing in accordance with  CDC guidelines.   Performed At: [facility] & [address] [id number]  Venita Mahan MD QP:[id number]      HIV 1/2 Interpretation 12/06/2022 NONREACTIVE  NONREACTIVE   Final    HIV 1/2 result comment 12/06/2022 SEE NOTE    Final    Comment: While this assay is highly sensitive, a non-reactive/negative result for HIV antibodies HIV-1 and HIV-2 and p24 antigen, does not exclude the possibility of exposure to or infection with HIV. HIV antibodies and/or p24 antigen may be undetectable in some stages of the infection and in some clinical conditions. Test performed by the [employer] AxialMEDaur HIV Ag/Ab Combo (CHIV), 4th generation assay. Recommend consulting relevant CDC guidelines for informing test subject of the result and its interpretation. Hepatitis A, IgM 12/06/2022 NONREACTIVE  NONREACTIVE   Final    Method used is Siemens Advia Centaur    __ 12/06/2022        Final    Hepatitis B surface Ag 12/06/2022 0.10  Index Final    Hep B surface Ag Interp. 12/06/2022 Negative  Negative   Final    __ 12/06/2022        Final    Hepatitis B core, IgM 12/06/2022 NONREACTIVE  NONREACTIVE   Final    Method used is Siemens Advia Centaur    __ 12/06/2022        Final    Hep C virus Ab Interp. 12/06/2022 NONREACTIVE  NONREACTIVE   Final    Method used is DocbookMD 12/06/2022 URINE    Corrected    CORRECTED ON 12/07 AT 0159: PREVIOUSLY REPORTED AS URINE    M. genitalium by ZEB 12/06/2022 Positive (A)  Negative   Final    Comment: (NOTE)  This test was developed and its performance characteristics  determined by [facility]. It has not been cleared or approved  by the Food and Drug Administration.   Performed At: [facility] & [address] [id number]  Jean Paul Borjas MD YJ:[id number]      C. trachomatis by ZEB 12/06/2022 Positive (A)  Negative   Final    Comment: (NOTE)  Performed At: [facility] & [facility] [address] [place] [id number]  Jean Paul Borjas MD JT:[id number]      N. gonorrhoeae by ZEB 12/06/2022 Negative  Negative   Final    Comment: (NOTE)  Performed At: [facility] & [facility] [address] [place] [id number]  Jean Paul Borjas MD PH:[id number]      HSV-2 IgG Supplemental Test 12/06/2022 Negative  Negative   Final    Comment: (NOTE)  Note: Positive supplemental testing indicates the  presence of detectable IgG antibodies to HSV-2. Negative supplemental testing does not confirm the  presence of IgG antibodies to HSV-2; recommend  re-testing in 2 to 4 weeks if clinically indicated. Performed At: [facility] & [address] [id number]  Kiara Sal MD :[id number]        MRI LUMB SPINE WO CONT  Narrative: EXAM:  MRI LUMB SPINE WO CONT    HISTORY: Low back pain  INDICATION:  Low back pain. COMPARISON: None    TECHNIQUE: MR imaging of the lumbar spine was performed using the following  sequences: sagittal T1, T2, STIR;  axial T1, T2.     CONTRAST:  None. FINDINGS:    There is normal alignment of the lumbar spine. Vertebral body heights are  maintained. There is no spondylolysis or spondylolisthesis. No pedicle marrow  edema. The conus medullaris terminates at T12-L1. Signal and caliber of the distal  spinal cord are within normal limits. The paraspinal soft tissues are within normal limits. Lower thoracic spine: No herniation or stenosis. L1-L2:  No herniation or stenosis. L2-L3:  No herniation or stenosis. L3-L4:  No herniation or stenosis. L4-L5:  Mild broad-based protrusion. Mild to moderate canal stenosis. Foramina  are patent. L5-S1:  No herniation or stenosis. Impression: IMPRESSION:  Disc protrusion at L4-5 with mild to moderate canal stenosis at L4-5. No  foraminal stenosis. There is no evidence of fracture or dislocation. No spondylolysis or  spondylolisthesis. Current Outpatient Medications   Medication Sig Dispense Refill    sertraline (ZOLOFT) 100 mg tablet Take 1 Tablet by mouth daily. 90 Tablet 0    diclofenac (VOLTAREN) 1 % gel Apply  to affected area as needed for Pain. 1 Each 1    norethindrone-ethinyl estradiol (JUNEL FE 1/20) 1 mg-20 mcg (21)/75 mg (7) tab Take  by mouth. ibuprofen (MOTRIN) 200 mg tablet Take 200 mg by mouth every six (6) hours as needed for Pain. The past medical history, past surgical history, and family history were reviewed and updated in the medical record. Lab values/Imaging were reviewed. The medications were reviewed and updated in the medical record. Immunizations were reviewed and updated in the medical record. All relevant preventative screenings reviewed and updated in the medical record. REVIEW OF SYSTEMS   Review of Systems   Constitutional:  Negative for chills, diaphoresis, fever, malaise/fatigue and weight loss. Respiratory:  Negative for cough, shortness of breath and wheezing. Cardiovascular:  Negative for chest pain, palpitations and leg swelling. Gastrointestinal:  Positive for constipation and diarrhea. Negative for abdominal pain, blood in stool, heartburn, melena, nausea and vomiting. Genitourinary:  Negative for dysuria. Neurological:  Positive for dizziness. Negative for weakness and headaches. Psychiatric/Behavioral:  Positive for depression. Negative for suicidal ideas. The patient is nervous/anxious. PHYSICAL EXAM   /77 (BP 1 Location: Left upper arm, BP Patient Position: Sitting, BP Cuff Size: Adult)   Pulse 85   Temp 97.8 °F (36.6 °C) (Temporal)   Resp 18   Ht 5' 7\" (1.702 m)   Wt 152 lb 9.6 oz (69.2 kg)   SpO2 99%   BMI 23.90 kg/m²      Physical Exam  Vitals and nursing note reviewed. Constitutional:       General: She is not in acute distress. Appearance: Normal appearance. She is normal weight. Cardiovascular:      Rate and Rhythm: Normal rate and regular rhythm. Pulses: Normal pulses. Heart sounds: Normal heart sounds. No murmur heard. Pulmonary:      Effort: Pulmonary effort is normal. No respiratory distress. Breath sounds: Normal breath sounds. No wheezing. Skin:     General: Skin is warm and dry. Neurological:      General: No focal deficit present. Mental Status: She is alert and oriented to person, place, and time. Mental status is at baseline. Cranial Nerves: No cranial nerve deficit. Psychiatric:         Mood and Affect: Mood normal.         Behavior: Behavior normal.         Thought Content: Thought content normal.         Judgment: Judgment normal.          ASSESSMENT/ PLAN   Below is the assessment and plan developed based on review of pertinent history, physical exam, labs, studies, and medications. 1. Routine screening for STI (sexually transmitted infection)  -     Asymptomatic for STI. Will recheck. Discussed safe sex practices.   - CT+NG+M GENITALIUM BY ZEB, UR; Future  -     HSV 1 AND 2-SPEC AB, IGG W/RFX TO SUPPL HSV-2 TESTING; Future  -     HIV 1/2 AG/AB, 4TH GENERATION,W RFLX CONFIRM; Future  -     HEPATITIS PANEL, ACUTE; Future  2. Dizziness  -     Discussed keeping a log of when this occurs to better understand episodes. Will check labs. - CBC WITH AUTOMATED DIFF; Future  -     THYROID CASCADE PROFILE; Future  3. Anxiety and depression  -     I added Buspar 10mg BID. Potential side effects were discussed. - busPIRone (BUSPAR) 10 mg tablet; Take 1 Tablet by mouth two (2) times a day., Normal, Disp-60 Tablet, R-1  -     sertraline (ZOLOFT) 100 mg tablet; Take 1 Tablet by mouth daily. , Normal, Disp-90 Tablet, R-0  4. Constipation, unspecified constipation type  - Discussed adding a fiber supplement to her diet. Continue to stay well hydrated. 5. Diarrhea, unspecified type  - Can try a probiotic or Imodium PRN over the counter. 6. Missed period  -     Discussed safe sex practices. - HCG URINE, QL; Future             Disclaimer:  Advised patient to call back or return to office if symptoms worsen/change/persist.  Discussed expected course/resolution/complications of diagnosis in detail with patient. Medication risks/benefits/alternatives discussed with patient.   Patient was given an after visit summary which includes diagnoses, current medications, & vitals. Discussed patient instructions and advised to read to all patient instructions regarding care. Patient expressed understanding with the diagnosis and plan.        Niraj Vigil NP  1/17/2023

## 2023-01-17 NOTE — PROGRESS NOTES
Identified pt with two pt identifiers(name and ). Chief Complaint   Patient presents with    Labs     Would recheck of Std testing        3 most recent PHQ Screens 2023   Little interest or pleasure in doing things Not at all   Feeling down, depressed, irritable, or hopeless Not at all   Total Score PHQ 2 0   Trouble falling or staying asleep, or sleeping too much -   Feeling tired or having little energy -   Poor appetite, weight loss, or overeating -   Feeling bad about yourself - or that you are a failure or have let yourself or your family down -   Trouble concentrating on things such as school, work, reading, or watching TV -   Moving or speaking so slowly that other people could have noticed; or the opposite being so fidgety that others notice -   Thoughts of being better off dead, or hurting yourself in some way -   PHQ 9 Score -   How difficult have these problems made it for you to do your work, take care of your home and get along with others -        Vitals:    23 1006   BP: 119/77   Pulse: 85   Resp: 18   Temp: 97.8 °F (36.6 °C)   TempSrc: Temporal   SpO2: 99%   Weight: 152 lb 9.6 oz (69.2 kg)   Height: 5' 7\" (1.702 m)   PainSc:   0 - No pain       Health Maintenance Due   Topic Date Due    Flu Vaccine (1) 2022        1. Have you been to the ER, urgent care clinic since your last visit? Hospitalized since your last visit? No    2. Have you seen or consulted any other health care providers outside of the [address] [place] [address] since your last visit? Include any pap smears or colon screening. No    3. For patients aged 39-70: Has the patient had a colonoscopy / FIT/ Cologuard? NA - based on age      If the patient is female:    4. For patients aged 41-77: Has the patient had a mammogram within the past 2 years? NA - based on age or sex      11. For patients aged 21-65: Has the patient had a pap smear?  Yes - no Care Gap present

## 2023-02-16 RX ORDER — NORETHINDRONE ACETATE AND ETHINYL ESTRADIOL 1MG-20(21)
1 KIT ORAL DAILY
Qty: 3 DOSE PACK | Refills: 0 | Status: SHIPPED | OUTPATIENT
Start: 2023-02-16

## 2023-03-13 ENCOUNTER — OFFICE VISIT (OUTPATIENT)
Dept: PRIMARY CARE CLINIC | Age: 23
End: 2023-03-13
Payer: COMMERCIAL

## 2023-03-13 VITALS
SYSTOLIC BLOOD PRESSURE: 114 MMHG | WEIGHT: 157.6 LBS | OXYGEN SATURATION: 98 % | DIASTOLIC BLOOD PRESSURE: 73 MMHG | HEART RATE: 69 BPM | RESPIRATION RATE: 18 BRPM | BODY MASS INDEX: 24.74 KG/M2 | TEMPERATURE: 97.1 F | HEIGHT: 67 IN

## 2023-03-13 DIAGNOSIS — F32.A ANXIETY AND DEPRESSION: Primary | ICD-10-CM

## 2023-03-13 DIAGNOSIS — F41.9 ANXIETY AND DEPRESSION: Primary | ICD-10-CM

## 2023-03-13 DIAGNOSIS — Z11.3 ROUTINE SCREENING FOR STI (SEXUALLY TRANSMITTED INFECTION): ICD-10-CM

## 2023-03-13 DIAGNOSIS — R63.5 WEIGHT GAIN: ICD-10-CM

## 2023-03-13 PROCEDURE — 99214 OFFICE O/P EST MOD 30 MIN: CPT

## 2023-03-13 RX ORDER — BUSPIRONE HYDROCHLORIDE 10 MG/1
10 TABLET ORAL 2 TIMES DAILY
Qty: 180 TABLET | Refills: 0 | Status: SHIPPED | OUTPATIENT
Start: 2023-03-13

## 2023-03-13 RX ORDER — SERTRALINE HYDROCHLORIDE 100 MG/1
100 TABLET, FILM COATED ORAL DAILY
Qty: 90 TABLET | Refills: 0 | Status: SHIPPED | OUTPATIENT
Start: 2023-03-13

## 2023-03-13 NOTE — PROGRESS NOTES
[facility]   [address], [address] [facility]  P: [phone]  F: [phone]    SUBJECTIVE     HPI:     Deniz Tran is a 25 y.o. female who is seen in the clinic for   Chief Complaint   Patient presents with    Follow-up          Established patient here for a follow up. She was last seen by me 1/17 for STi screening. Her HSV2 IgG supplemental test was postive. However, she does not have symptoms of HSV. Hepatitis panel, HIV, HSV1 were negative. Her chlamydia and ghonorrhea test was not completed by the lab. She had her thyroid checked as she had c/o dizziness. TSH was normal. CBC was normal as well. She has not had any recent dizziness. She has noticed that she has been gradually gaining weight for the past year. Per chart review she has gained 11 lbs since June 2022. She feels like her level of physical activity and diet has not changed in the past year. She would like to talk about this with a dietician. She has a hx of anxiety and depression. At her appointment in January we added Busapr 10mg BID to her regimen of Zoloft 100mg daily. She states that this has been working well for her. Weight gain has been making her feel poorly about herself. Patient Active Problem List    Diagnosis    Overweight (BMI 25.0-29. 9)        Past Medical History:   Diagnosis Date    Anemia 10/7/2021    Depression      Past Surgical History:   Procedure Laterality Date    HX WISDOM TEETH EXTRACTION       Social History     Socioeconomic History    Marital status: SINGLE     Spouse name: Not on file    Number of children: Not on file    Years of education: Not on file    Highest education level: Not on file   Occupational History    Not on file   Tobacco Use    Smoking status: Never    Smokeless tobacco: Never   Vaping Use    Vaping Use: Never used   Substance and Sexual Activity    Alcohol use:  Yes     Alcohol/week: 20.0 standard drinks     Types: 3 Cans of beer, 11 Shots of liquor, 6 Standard drinks or equivalent per week     Comment: Not regularly    Drug use: Yes     Frequency: 3.0 times per week     Types: Marijuana     Comment: occasionally    Sexual activity: Yes     Partners: Male     Birth control/protection: None     Comment: Oral only   Other Topics Concern    Not on file   Social History Narrative    ** Merged History Encounter **          Social Determinants of Health     Financial Resource Strain: Not on file   Food Insecurity: Not on file   Transportation Needs: Not on file   Physical Activity: Sufficiently Active    Days of Exercise per Week: 7 days    Minutes of Exercise per Session: 90 min   Stress: Not on file   Social Connections: Not on file   Intimate Partner Violence: Unknown    Fear of Current or Ex-Partner: Patient refused    Emotionally Abused: Patient refused    Physically Abused: Patient refused    Sexually Abused: Patient refused   Housing Stability: Not on file     Family History   Problem Relation Age of Onset    No Known Problems Mother     Hypertension Father     Elevated Lipids Father     Diabetes Maternal Grandmother     Hypertension Maternal Grandmother     Cancer Maternal Grandfather         liver cancer    Cancer Paternal Grandmother         unknown    Heart Attack Paternal Grandfather     Heart Disease Paternal Grandfather     Breast Cancer Paternal Aunt      Immunization History   Administered Date(s) Administered    COVID-19, MODERNA BLUE border, Primary or Immunocompromised, (age 18y+), IM, 100 mcg/0.5mL 04/17/2021, 05/15/2021    DTaP 08/12/2004    HPV (9-valent) 12/17/2018, 02/21/2019, 06/27/2019    Influenza Vaccine 12/16/2016, 11/28/2017    Influenza, FLUARIX, FLULAVAL, Darlis Nipper (age 10 mo+) AND AFLURIA, (age 1 y+), PF, 0.5mL 11/23/2018, 10/19/2021    MMR 08/12/2004    Meningococcal ACWY Vaccine 08/08/2018    Poliovirus vaccine 08/12/2004    Tdap 08/21/2015    Varicella Virus Vaccine 07/25/2013      No Known Allergies    Orders Only on 01/17/2023 Component Date Value Ref Range Status    WBC 01/17/2023 5.8  3.6 - 11.0 K/uL Final    RBC 01/17/2023 4.50  3.80 - 5.20 M/uL Final    HGB 01/17/2023 13.3  11.5 - 16.0 g/dL Final    HCT 01/17/2023 42.7  35.0 - 47.0 % Final    MCV 01/17/2023 94.9  80.0 - 99.0 FL Final    MCH 01/17/2023 29.6  26.0 - 34.0 PG Final    MCHC 01/17/2023 31.1  30.0 - 36.5 g/dL Final    RDW 01/17/2023 12.5  11.5 - 14.5 % Final    PLATELET 99/67/4471 893  150 - 400 K/uL Final    MPV 01/17/2023 12.5  8.9 - 12.9 FL Final    NRBC 01/17/2023 0.0  0  WBC Final    ABSOLUTE NRBC 01/17/2023 0.00  0.00 - 0.01 K/uL Final    NEUTROPHILS 01/17/2023 48  32 - 75 % Final    LYMPHOCYTES 01/17/2023 38  12 - 49 % Final    MONOCYTES 01/17/2023 10  5 - 13 % Final    EOSINOPHILS 01/17/2023 2  0 - 7 % Final    BASOPHILS 01/17/2023 2 (A)  0 - 1 % Final    IMMATURE GRANULOCYTES 01/17/2023 0  0.0 - 0.5 % Final    ABS. NEUTROPHILS 01/17/2023 2.9  1.8 - 8.0 K/UL Final    ABS. LYMPHOCYTES 01/17/2023 2.2  0.8 - 3.5 K/UL Final    ABS. MONOCYTES 01/17/2023 0.6  0.0 - 1.0 K/UL Final    ABS. EOSINOPHILS 01/17/2023 0.1  0.0 - 0.4 K/UL Final    ABS. BASOPHILS 01/17/2023 0.1  0.0 - 0.1 K/UL Final    ABS. IMM. GRANS. 01/17/2023 0.0  0.00 - 0.04 K/UL Final    DF 01/17/2023 AUTOMATED    Final    TSH 01/17/2023 1.490  0.450 - 4.500 uIU/mL Final    Comment: (NOTE)  No apparent thyroid disorder. Additional testing not indicated. In  rare instances, Secondary Hypothyroidism as well as Subclinical  Hypothyroidism have been reported in some patients with normal TSH  values.   Performed At: [address] [id number]  Silverio Guerrero MD UF:[id number]      HSV 1 Ab, IgG, type spec. 01/17/2023 <0.91  0.00 - 0.90 index Final    Comment: (NOTE)                                  Negative        <0.91                                  Equivocal 0.91 - 1.09                                  Positive        >1.09  Note: Negative indicates no antibodies detected to  HSV-1. Equivocal may suggest early infection. If  clinically appropriate, retest at later date. Positive  indicates antibodies detected to HSV-1.      HSV 2 Ab IgG, type spec. 01/17/2023 4.00 (A)  0.00 - 0.90 index Final    Comment: (NOTE)                                  Negative        <0.91                                  Equivocal 0.91 - 1.09                                  Positive        >1.09  Note: Negative indicates no HSV-2 antibodies detected. Positive indicates HSV-2 antibodies detected. Equivocal and low positive HSV-2 screens  (Index 0.91-5.00) may be false positive and are  reflexed to supplemental testing in accordance with  CDC guidelines. Performed At: [facility] & [address] [id number]  Missy Montoya MD KE:[id number]      HIV 1/2 Interpretation 01/17/2023 NONREACTIVE  NONREACTIVE   Final    HIV 1/2 result comment 01/17/2023 SEE NOTE    Final    Comment: While this assay is highly sensitive, a non-reactive/negative result for HIV antibodies HIV-1 and HIV-2 and p24 antigen, does not exclude the possibility of exposure to or infection with HIV. HIV antibodies and/or p24 antigen may be undetectable in some stages of the infection and in some clinical conditions. Test performed by the [employer] D-Sightaur HIV Ag/Ab Combo (CHIV), 4th generation assay. Recommend consulting relevant CDC guidelines for informing test subject of the result and its interpretation.       Hepatitis A, IgM 01/17/2023 NONREACTIVE  NONREACTIVE   Final    Method used is Siemens Advia Centaur    __ 01/17/2023        Final    Hepatitis B surface Ag 01/17/2023 <0.10  Index Final    Hep B surface Ag Interp. 01/17/2023 Negative  Negative   Final    __ 01/17/2023        Final    Hepatitis B core, IgM 01/17/2023 NONREACTIVE  NONREACTIVE   Final    Method used is Siemens Advia Centaur    __ 01/17/2023        Final    Hep C virus Ab Interp. 01/17/2023 NONREACTIVE  NONREACTIVE   Final    Method used is [facility] urine, QL 01/17/2023 Negative  Negative   Final    HSV-2 IgG Supplemental Test 01/17/2023 Positive (A)  Negative   Final    Comment: (NOTE)  Note: Positive supplemental testing indicates the  presence of detectable IgG antibodies to HSV-2. Negative supplemental testing does not confirm the  presence of IgG antibodies to HSV-2; recommend  re-testing in 2 to 4 weeks if clinically indicated. Performed At: [facility] & [address] [id number]  Tiffanie Thomas MD PA:[id number]     Orders Only on 12/22/2022   Component Date Value Ref Range Status    SAMPLES BEING HELD 12/22/2022 APTIMA   Final    COMMENT 12/22/2022 Add-on orders for these samples will be processed based on acceptable specimen integrity and analyte stability, which may vary by analyte. Final      MRI LUMB SPINE WO CONT  Narrative: EXAM:  MRI LUMB SPINE WO CONT    HISTORY: Low back pain  INDICATION:  Low back pain. COMPARISON: None    TECHNIQUE: MR imaging of the lumbar spine was performed using the following  sequences: sagittal T1, T2, STIR;  axial T1, T2.     CONTRAST:  None. FINDINGS:    There is normal alignment of the lumbar spine. Vertebral body heights are  maintained. There is no spondylolysis or spondylolisthesis. No pedicle marrow  edema. The conus medullaris terminates at T12-L1. Signal and caliber of the distal  spinal cord are within normal limits. The paraspinal soft tissues are within normal limits. Lower thoracic spine: No herniation or stenosis. L1-L2:  No herniation or stenosis. L2-L3:  No herniation or stenosis. L3-L4:  No herniation or stenosis. L4-L5:  Mild broad-based protrusion. Mild to moderate canal stenosis. Foramina  are patent. L5-S1:  No herniation or stenosis. Impression: IMPRESSION:  Disc protrusion at L4-5 with mild to moderate canal stenosis at L4-5. No  foraminal stenosis. There is no evidence of fracture or dislocation. No spondylolysis or  spondylolisthesis. Current Outpatient Medications   Medication Sig Dispense Refill    norethindrone-ethinyl estradiol (JUNEL FE 1/20) 1 mg-20 mcg (21)/75 mg (7) tab Take 1 Tablet by mouth daily. 3 Dose Pack 0    busPIRone (BUSPAR) 10 mg tablet Take 1 Tablet by mouth two (2) times a day. 60 Tablet 1    sertraline (ZOLOFT) 100 mg tablet Take 1 Tablet by mouth daily. 90 Tablet 0    diclofenac (VOLTAREN) 1 % gel Apply  to affected area as needed for Pain. 1 Each 1    ibuprofen (MOTRIN) 200 mg tablet Take 200 mg by mouth every six (6) hours as needed for Pain. The past medical history, past surgical history, and family history were reviewed and updated in the medical record. Lab values/Imaging were reviewed. The medications were reviewed and updated in the medical record. Immunizations were reviewed and updated in the medical record. All relevant preventative screenings reviewed and updated in the medical record. REVIEW OF SYSTEMS   Review of Systems   Constitutional:  Negative for chills, diaphoresis, fever, malaise/fatigue and weight loss. Respiratory:  Negative for cough, shortness of breath and wheezing. Cardiovascular:  Negative for chest pain, palpitations and leg swelling. Gastrointestinal:  Negative for constipation, diarrhea, nausea and vomiting. Neurological:  Negative for dizziness, weakness and headaches. Psychiatric/Behavioral:  Negative for depression. All other systems reviewed and are negative. PHYSICAL EXAM   /73 (BP 1 Location: Left upper arm, BP Patient Position: Sitting, BP Cuff Size: Adult)   Pulse 69   Temp 97.1 °F (36.2 °C) (Temporal)   Resp 18   Ht 5' 7\" (1.702 m)   Wt 157 lb 9.6 oz (71.5 kg)   LMP 02/20/2023 (Exact Date)   SpO2 98%   BMI 24.68 kg/m²      Physical Exam  Vitals and nursing note reviewed. Constitutional:       General: She is not in acute distress. Appearance: Normal appearance. She is normal weight. She is not ill-appearing. Eyes:      Extraocular Movements: Extraocular movements intact. Conjunctiva/sclera: Conjunctivae normal.      Pupils: Pupils are equal, round, and reactive to light. Cardiovascular:      Rate and Rhythm: Normal rate and regular rhythm. Pulses: Normal pulses. Heart sounds: Normal heart sounds. No murmur heard. No gallop. Pulmonary:      Effort: Pulmonary effort is normal. No respiratory distress. Breath sounds: Normal breath sounds. No wheezing or rales. Skin:     General: Skin is warm and dry. Capillary Refill: Capillary refill takes less than 2 seconds. Coloration: Skin is not pale. Findings: No rash. Neurological:      General: No focal deficit present. Mental Status: She is alert and oriented to person, place, and time. Mental status is at baseline. Cranial Nerves: No cranial nerve deficit. Sensory: No sensory deficit. Motor: No weakness. Gait: Gait normal.   Psychiatric:         Mood and Affect: Mood normal.         Behavior: Behavior normal.          ASSESSMENT/ PLAN   Below is the assessment and plan developed based on review of pertinent history, physical exam, labs, studies, and medications. 1. Anxiety and depression  -     Stable. Continue Buspar, Zoloft. Refills sent to her pharmacy. - busPIRone (BUSPAR) 10 mg tablet; Take 1 Tablet by mouth two (2) times a day., Normal, Disp-180 Tablet, R-0  -     sertraline (ZOLOFT) 100 mg tablet; Take 1 Tablet by mouth daily. , Normal, Disp-90 Tablet, R-0  2. Routine screening for STI (sexually transmitted infection)  -     She is asymptomatic for STI. Labs pending.   - CT+NG+M GENITALIUM BY ZEB, UR; Future  3. Weight gain  -     I encouraged her to get at least 150 minutes of exercise each week. Continue well rounded diet with focus on portion control.    - She would like to discuss this with a Nutritionist. Referral placed.    - REFERRAL TO NUTRITION         Return to clinic in June for annual physical exam.       Disclaimer:  Advised patient to call back or return to office if symptoms worsen/change/persist.  Discussed expected course/resolution/complications of diagnosis in detail with patient. Medication risks/benefits/alternatives discussed with patient. Patient was given an after visit summary which includes diagnoses, current medications, & vitals. Discussed patient instructions and advised to read to all patient instructions regarding care. Patient expressed understanding with the diagnosis and plan.        Verito Snowden NP  3/13/2023 no

## 2023-03-13 NOTE — PROGRESS NOTES
Identified pt with two pt identifiers(name and ). Chief Complaint   Patient presents with    Follow-up        3 most recent PHQ Screens 3/13/2023   Little interest or pleasure in doing things Several days   Feeling down, depressed, irritable, or hopeless Several days   Total Score PHQ 2 2   Trouble falling or staying asleep, or sleeping too much -   Feeling tired or having little energy -   Poor appetite, weight loss, or overeating -   Feeling bad about yourself - or that you are a failure or have let yourself or your family down -   Trouble concentrating on things such as school, work, reading, or watching TV -   Moving or speaking so slowly that other people could have noticed; or the opposite being so fidgety that others notice -   Thoughts of being better off dead, or hurting yourself in some way -   PHQ 9 Score -   How difficult have these problems made it for you to do your work, take care of your home and get along with others -        Vitals:    23 1008   BP: 114/73   Pulse: 69   Resp: 18   Temp: 97.1 °F (36.2 °C)   TempSrc: Temporal   SpO2: 98%   Weight: 157 lb 9.6 oz (71.5 kg)   Height: 5' 7\" (1.702 m)   PainSc:   0 - No pain   LMP: 2023       There are no preventive care reminders to display for this patient. 1. Have you been to the ER, urgent care clinic since your last visit? Hospitalized since your last visit? No    2. Have you seen or consulted any other health care providers outside of the [address] [place] [address] since your last visit? Include any pap smears or colon screening. No    3. For patients aged 39-70: Has the patient had a colonoscopy / FIT/ Cologuard? NA - based on age      If the patient is female:    4. For patients aged 41-77: Has the patient had a mammogram within the past 2 years? NA - based on age or sex      11. For patients aged 21-65: Has the patient had a pap smear?  Yes - no Care Gap present

## 2023-05-09 RX ORDER — NORETHINDRONE ACETATE AND ETHINYL ESTRADIOL AND FERROUS FUMARATE 1MG-20(21)
KIT ORAL
Qty: 84 TABLET | Refills: 0 | Status: SHIPPED | OUTPATIENT
Start: 2023-05-09

## 2023-05-09 NOTE — TELEPHONE ENCOUNTER
25year old patient last seen in the office on 1/6/2022 and has next ae on 7/11/2023      Prescription sent as per Md verbal order to get patient to her scheduled appointment

## 2023-06-23 DIAGNOSIS — F41.9 ANXIETY DISORDER, UNSPECIFIED: ICD-10-CM

## 2023-06-23 RX ORDER — SERTRALINE HYDROCHLORIDE 100 MG/1
TABLET, FILM COATED ORAL
Qty: 90 TABLET | Refills: 0 | Status: SHIPPED | OUTPATIENT
Start: 2023-06-23

## 2023-06-23 NOTE — TELEPHONE ENCOUNTER
PCP: RASHI Araiza - HERNANDO    Last Visit 3/13/2023   Future Appointments   Date Time Provider Francesco Lanier   7/11/2023  1:00 PM Cedrick Cramer MD [facility] BS AMB       Requested Prescriptions     Pending Prescriptions Disp Refills    sertraline (ZOLOFT) 100 MG tablet [Pharmacy Med Name: SERTRALINE  MG TABLET] 90 tablet      Sig: TAKE 1 TABLET BY MOUTH EVERY DAY         Other Comments: Last Refill 03/13/2023

## 2023-07-08 DIAGNOSIS — F41.9 ANXIETY DISORDER, UNSPECIFIED: ICD-10-CM

## 2023-07-10 RX ORDER — BUSPIRONE HYDROCHLORIDE 10 MG/1
TABLET ORAL
Qty: 60 TABLET | Refills: 2 | Status: SHIPPED | OUTPATIENT
Start: 2023-07-10

## 2023-07-10 NOTE — TELEPHONE ENCOUNTER
PCP: Peyton Barros, APRN - NP    Last Visit 3/13/2023   Future Appointments   Date Time Provider [address]   7/11/2023  1:00 PM Jazmin Price MD [facility] BS AMB       Requested Prescriptions     Pending Prescriptions Disp Refills    busPIRone (BUSPAR) 10 MG tablet [Pharmacy Med Name: BUSPIRONE HCL 10 MG TABLET] 60 tablet 2     Sig: TAKE 1 TABLET BY MOUTH TWO TIMES A DAY.          Other Comments: Last Refill 03/13/2023

## 2023-07-11 ENCOUNTER — OFFICE VISIT (OUTPATIENT)
Age: 23
End: 2023-07-11

## 2023-07-11 VITALS — DIASTOLIC BLOOD PRESSURE: 74 MMHG | SYSTOLIC BLOOD PRESSURE: 110 MMHG | BODY MASS INDEX: 24.03 KG/M2 | WEIGHT: 153.4 LBS

## 2023-07-11 DIAGNOSIS — Z11.3 SCREENING EXAMINATION FOR STD (SEXUALLY TRANSMITTED DISEASE): ICD-10-CM

## 2023-07-11 DIAGNOSIS — Z01.419 ENCOUNTER FOR GYNECOLOGICAL EXAMINATION WITHOUT ABNORMAL FINDING: Primary | ICD-10-CM

## 2023-07-11 PROCEDURE — 99395 PREV VISIT EST AGE 18-39: CPT | Performed by: OBSTETRICS & GYNECOLOGY

## 2023-07-11 RX ORDER — DROSPIRENONE AND ETHINYL ESTRADIOL 0.02-3(28)
1 KIT ORAL DAILY
Qty: 3 PACKET | Refills: 4 | Status: SHIPPED | OUTPATIENT
Start: 2023-07-11 | End: 2023-10-09

## 2023-07-11 NOTE — PROGRESS NOTES
Annual Exam    Cordell Welch is a 24 y.o. G0 presenting for annual exam.    Since starting Junel ocps, cycles are more regular but having increased cramping and diarrhea associated with cycles. Interested in trial of a different OCP. Accepts std cultures and declines bloodwork. She does report recent h/o chlamydia with neg MARYURI. H/o AUB and irregular menses q 2-3 months. At prior visit she TSH, PRL, and PCOS labs were normal.    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 Wants to do photography. Ob/Gyn Hx:  G0   LMP- 07/11/23  Menarche- 10  Menses- regular  Contraception-junel ocp  STI- Chlamydia  ? SA-yes      Health maintenance:  Pap- 1/6/22 neg  Gardasil-3/3    History reviewed. No pertinent past medical history.     Past Surgical History:   Procedure Laterality Date    HX WISDOM TEETH EXTRACTION         Family History   Problem Relation Age of Onset    No Known Problems Mother     Hypertension Father     Elevated Lipids Father     Diabetes Maternal Grandmother     Hypertension Maternal Grandmother     Cancer Maternal Grandfather         liver cancer    Cancer Paternal Grandmother         unknown    Heart Attack Paternal Grandfather     Heart Disease Paternal Grandfather     Breast Cancer Paternal Aunt        Social History     Socioeconomic History    Marital status: SINGLE     Spouse name: Not on file    Number of children: Not on file    Years of education: Not on file    Highest education level: Not on file   Occupational History    Not on file   Tobacco Use    Smoking status: Never Smoker    Smokeless tobacco: Never Used   Vaping Use    Vaping Use: Never used   Substance and Sexual Activity    Alcohol use: Yes    Drug use: Yes     Types: Marijuana    Sexual activity: Yes     Partners: Male     Birth control/protection: Pill   Other Topics Concern    Not on file   Social History Narrative    ** Merged History Encounter **          Social Determinants of Health

## 2023-07-15 LAB
C TRACH RRNA SPEC QL NAA+PROBE: POSITIVE
N GONORRHOEA RRNA SPEC QL NAA+PROBE: NEGATIVE
SPECIMEN SOURCE: ABNORMAL
T VAGINALIS RRNA SPEC QL NAA+PROBE: NEGATIVE

## 2023-07-17 RX ORDER — AZITHROMYCIN 500 MG/1
1000 TABLET, FILM COATED ORAL ONCE
Qty: 2 TABLET | Refills: 0 | Status: SHIPPED | OUTPATIENT
Start: 2023-07-17 | End: 2023-07-17

## 2023-08-10 ENCOUNTER — OFFICE VISIT (OUTPATIENT)
Age: 23
End: 2023-08-10
Payer: COMMERCIAL

## 2023-08-10 VITALS — DIASTOLIC BLOOD PRESSURE: 84 MMHG | SYSTOLIC BLOOD PRESSURE: 124 MMHG | WEIGHT: 158 LBS | BODY MASS INDEX: 24.75 KG/M2

## 2023-08-10 DIAGNOSIS — Z11.3 SCREENING EXAMINATION FOR STD (SEXUALLY TRANSMITTED DISEASE): Primary | ICD-10-CM

## 2023-08-10 DIAGNOSIS — N89.8 VAGINAL ITCHING: ICD-10-CM

## 2023-08-10 DIAGNOSIS — N94.6 DYSMENORRHEA: ICD-10-CM

## 2023-08-10 PROCEDURE — 99213 OFFICE O/P EST LOW 20 MIN: CPT | Performed by: OBSTETRICS & GYNECOLOGY

## 2023-08-12 LAB
A VAGINAE DNA VAG QL NAA+PROBE: NORMAL SCORE
BVAB2 DNA VAG QL NAA+PROBE: NORMAL SCORE
C ALBICANS DNA VAG QL NAA+PROBE: NEGATIVE
C GLABRATA DNA VAG QL NAA+PROBE: NEGATIVE
C TRACH DNA VAG QL NAA+PROBE: NEGATIVE
MEGA1 DNA VAG QL NAA+PROBE: NORMAL SCORE
N GONORRHOEA DNA VAG QL NAA+PROBE: NEGATIVE
T VAGINALIS DNA VAG QL NAA+PROBE: NEGATIVE

## 2023-09-12 ENCOUNTER — OFFICE VISIT (OUTPATIENT)
Dept: PRIMARY CARE CLINIC | Facility: CLINIC | Age: 23
End: 2023-09-12
Payer: COMMERCIAL

## 2023-09-12 VITALS
OXYGEN SATURATION: 99 % | RESPIRATION RATE: 16 BRPM | BODY MASS INDEX: 23.79 KG/M2 | TEMPERATURE: 97.1 F | HEIGHT: 67 IN | WEIGHT: 151.6 LBS | HEART RATE: 72 BPM | SYSTOLIC BLOOD PRESSURE: 117 MMHG | DIASTOLIC BLOOD PRESSURE: 76 MMHG

## 2023-09-12 DIAGNOSIS — G89.29 CHRONIC MIDLINE LOW BACK PAIN WITHOUT SCIATICA: ICD-10-CM

## 2023-09-12 DIAGNOSIS — F32.A ANXIETY AND DEPRESSION: Primary | ICD-10-CM

## 2023-09-12 DIAGNOSIS — Z86.19 HX OF CHLAMYDIA INFECTION: ICD-10-CM

## 2023-09-12 DIAGNOSIS — F41.9 ANXIETY AND DEPRESSION: Primary | ICD-10-CM

## 2023-09-12 DIAGNOSIS — M54.50 CHRONIC MIDLINE LOW BACK PAIN WITHOUT SCIATICA: ICD-10-CM

## 2023-09-12 PROCEDURE — 99214 OFFICE O/P EST MOD 30 MIN: CPT

## 2023-09-12 RX ORDER — ESCITALOPRAM OXALATE 10 MG/1
10 TABLET ORAL DAILY
Qty: 30 TABLET | Refills: 1 | Status: SHIPPED | OUTPATIENT
Start: 2023-09-12

## 2023-09-12 SDOH — ECONOMIC STABILITY: HOUSING INSECURITY
IN THE LAST 12 MONTHS, WAS THERE A TIME WHEN YOU DID NOT HAVE A STEADY PLACE TO SLEEP OR SLEPT IN A SHELTER (INCLUDING NOW)?: NO

## 2023-09-12 SDOH — ECONOMIC STABILITY: FOOD INSECURITY: WITHIN THE PAST 12 MONTHS, THE FOOD YOU BOUGHT JUST DIDN'T LAST AND YOU DIDN'T HAVE MONEY TO GET MORE.: NEVER TRUE

## 2023-09-12 SDOH — ECONOMIC STABILITY: INCOME INSECURITY: HOW HARD IS IT FOR YOU TO PAY FOR THE VERY BASICS LIKE FOOD, HOUSING, MEDICAL CARE, AND HEATING?: NOT VERY HARD

## 2023-09-12 SDOH — ECONOMIC STABILITY: TRANSPORTATION INSECURITY
IN THE PAST 12 MONTHS, HAS LACK OF TRANSPORTATION KEPT YOU FROM MEETINGS, WORK, OR FROM GETTING THINGS NEEDED FOR DAILY LIVING?: NO

## 2023-09-12 SDOH — ECONOMIC STABILITY: FOOD INSECURITY: WITHIN THE PAST 12 MONTHS, YOU WORRIED THAT YOUR FOOD WOULD RUN OUT BEFORE YOU GOT MONEY TO BUY MORE.: NEVER TRUE

## 2023-09-12 ASSESSMENT — PATIENT HEALTH QUESTIONNAIRE - PHQ9
2. FEELING DOWN, DEPRESSED OR HOPELESS: 1
SUM OF ALL RESPONSES TO PHQ QUESTIONS 1-9: 2
SUM OF ALL RESPONSES TO PHQ QUESTIONS 1-9: 2
SUM OF ALL RESPONSES TO PHQ9 QUESTIONS 1 & 2: 2
SUM OF ALL RESPONSES TO PHQ QUESTIONS 1-9: 2
1. LITTLE INTEREST OR PLEASURE IN DOING THINGS: 1
SUM OF ALL RESPONSES TO PHQ QUESTIONS 1-9: 2

## 2023-09-12 ASSESSMENT — ENCOUNTER SYMPTOMS
SHORTNESS OF BREATH: 0
CHEST TIGHTNESS: 0
COUGH: 0
WHEEZING: 0
BACK PAIN: 1

## 2023-10-13 ENCOUNTER — OFFICE VISIT (OUTPATIENT)
Dept: PRIMARY CARE CLINIC | Facility: CLINIC | Age: 23
End: 2023-10-13
Payer: COMMERCIAL

## 2023-10-13 VITALS
BODY MASS INDEX: 24.17 KG/M2 | HEIGHT: 67 IN | WEIGHT: 154 LBS | RESPIRATION RATE: 16 BRPM | DIASTOLIC BLOOD PRESSURE: 80 MMHG | TEMPERATURE: 97.5 F | OXYGEN SATURATION: 99 % | HEART RATE: 77 BPM | SYSTOLIC BLOOD PRESSURE: 124 MMHG

## 2023-10-13 DIAGNOSIS — F32.A ANXIETY AND DEPRESSION: ICD-10-CM

## 2023-10-13 DIAGNOSIS — F41.9 ANXIETY AND DEPRESSION: ICD-10-CM

## 2023-10-13 PROCEDURE — 99213 OFFICE O/P EST LOW 20 MIN: CPT

## 2023-10-13 RX ORDER — ESCITALOPRAM OXALATE 10 MG/1
10 TABLET ORAL DAILY
Qty: 90 TABLET | Refills: 0 | Status: SHIPPED | OUTPATIENT
Start: 2023-10-13

## 2023-10-13 ASSESSMENT — ENCOUNTER SYMPTOMS
CHEST TIGHTNESS: 0
WHEEZING: 0
SHORTNESS OF BREATH: 0
COUGH: 0

## 2023-10-13 ASSESSMENT — PATIENT HEALTH QUESTIONNAIRE - PHQ9
2. FEELING DOWN, DEPRESSED OR HOPELESS: 0
SUM OF ALL RESPONSES TO PHQ QUESTIONS 1-9: 0
SUM OF ALL RESPONSES TO PHQ9 QUESTIONS 1 & 2: 0
SUM OF ALL RESPONSES TO PHQ QUESTIONS 1-9: 0
SUM OF ALL RESPONSES TO PHQ QUESTIONS 1-9: 0
1. LITTLE INTEREST OR PLEASURE IN DOING THINGS: 0
SUM OF ALL RESPONSES TO PHQ QUESTIONS 1-9: 0

## 2023-10-13 NOTE — PROGRESS NOTES
[facility]   [address] [place] [address] [address]  P: [phone]  F: [phone]    SUBJECTIVE     HPI:     Lorraine Patterson is a 21 y.o. female who is seen in the clinic for   Chief Complaint   Patient presents with    Follow-up     Established patient who presents for a follow up. We discussed management of anxiety, depression previously in 9/2023. At that time she was taking Zoloft and Buspar infrequently. We switched to Lexapro 10mg 9/2023 which has been working well for her for her. Still dealing with a good bit of stress at work. Parents have COVID-23, but are recovering at home. She has been able to manage her stress better after starting Lexapro, would like to continue taking it. She has not noticed any side effect of this medication. Considering starting counseling and would like referral to therapist.     Followed by Dr Kamini Canales, Ob-Gyn. Treated for Chlamydia in July 2023. Patient Active Problem List   Diagnosis    Overweight (BMI 25.0-29. 9)        Past Medical History:   Diagnosis Date    Anemia 10/7/2021    Anxiety     Chlamydia     Chronic back pain     Depression      Past Surgical History:   Procedure Laterality Date    WISDOM TOOTH EXTRACTION       Social History     Socioeconomic History    Marital status: Single     Spouse name: Not on file    Number of children: Not on file    Years of education: Not on file    Highest education level: Not on file   Occupational History    Not on file   Tobacco Use    Smoking status: Never    Smokeless tobacco: Never   Vaping Use    Vaping Use: Never used   Substance and Sexual Activity    Alcohol use: Yes    Drug use: Yes     Frequency: 3.0 times per week     Types: Marijuana Salli Endo)    Sexual activity: Not Currently     Partners: Male     Birth control/protection: None     Comment: Oral only   Other Topics Concern    Not on file   Social History Narrative    ** Merged History Encounter **          Social Determinants of Health

## 2023-10-23 ENCOUNTER — HOSPITAL ENCOUNTER (EMERGENCY)
Facility: HOSPITAL | Age: 23
Discharge: HOME | End: 2023-10-23
Attending: EMERGENCY MEDICINE
Payer: COMMERCIAL

## 2023-10-23 VITALS
DIASTOLIC BLOOD PRESSURE: 90 MMHG | HEART RATE: 77 BPM | SYSTOLIC BLOOD PRESSURE: 124 MMHG | HEIGHT: 67 IN | BODY MASS INDEX: 24.33 KG/M2 | RESPIRATION RATE: 16 BRPM | OXYGEN SATURATION: 98 % | WEIGHT: 154.98 LBS | TEMPERATURE: 98.6 F

## 2023-10-23 DIAGNOSIS — J06.9 VIRAL URI: Primary | ICD-10-CM

## 2023-10-23 PROCEDURE — 99282 EMERGENCY DEPT VISIT SF MDM: CPT

## 2023-10-23 ASSESSMENT — ENCOUNTER SYMPTOMS
DIARRHEA: 0
COLOR CHANGE: 0
NAUSEA: 0
CONSTIPATION: 0
BACK PAIN: 0
VOMITING: 0
COUGH: 0
ABDOMINAL PAIN: 0
SHORTNESS OF BREATH: 0
SORE THROAT: 1

## 2023-10-23 ASSESSMENT — PAIN DESCRIPTION - DESCRIPTORS: DESCRIPTORS: PRESSURE

## 2023-10-23 ASSESSMENT — LIFESTYLE VARIABLES
HOW OFTEN DO YOU HAVE A DRINK CONTAINING ALCOHOL: NEVER
HOW MANY STANDARD DRINKS CONTAINING ALCOHOL DO YOU HAVE ON A TYPICAL DAY: PATIENT DOES NOT DRINK

## 2023-10-23 ASSESSMENT — PAIN - FUNCTIONAL ASSESSMENT: PAIN_FUNCTIONAL_ASSESSMENT: 0-10

## 2023-10-23 ASSESSMENT — PAIN DESCRIPTION - ORIENTATION: ORIENTATION: RIGHT;LEFT

## 2023-10-23 ASSESSMENT — PAIN DESCRIPTION - LOCATION: LOCATION: EAR

## 2023-10-23 ASSESSMENT — PAIN SCALES - GENERAL: PAINLEVEL_OUTOF10: 5

## 2023-10-23 NOTE — ED PROVIDER NOTES
[facility]  EMERGENCY DEPARTMENT ENCOUNTER      Pt Name: Diane Trejo  MRN: [id number]  [address] [facility] [place] [address]  Date of evaluation: 10/23/2023  Provider: Richard Villanueva MD    [address] [facility]       Chief Complaint   Patient presents with    Otalgia         HISTORY OF PRESENT ILLNESS   (Location/Symptom, Timing/Onset, Context/Setting, Quality, Duration, Modifying Factors, Severity)  Note limiting factors. Diane Trejo is a 21 y.o. female who presents to the emergency department      The history is provided by the patient. No  was used. Ear Problem  Location:  Bilateral  Quality:  Dull  Severity:  Mild  Onset quality:  Sudden  Timing:  Constant  Progression:  Improving  Chronicity:  New  Relieved by:  OTC medications  Associated symptoms: sore throat    Associated symptoms: no abdominal pain, no congestion, no cough, no diarrhea, no ear discharge, no fever, no headaches, no neck pain and no vomiting        Nursing Notes were reviewed. REVIEW OF SYSTEMS    (2-9 systems for level 4, 10 or more for level 5)     Review of Systems   Constitutional:  Negative for activity change, chills and fever. HENT:  Positive for ear pain and sore throat. Negative for congestion, ear discharge and nosebleeds. Eyes:  Negative for visual disturbance. Respiratory:  Negative for cough and shortness of breath. Cardiovascular:  Negative for chest pain and palpitations. Gastrointestinal:  Negative for abdominal pain, constipation, diarrhea, nausea and vomiting. Genitourinary:  Negative for difficulty urinating, dysuria, hematuria and urgency. Musculoskeletal:  Negative for back pain, neck pain and neck stiffness. Skin:  Negative for color change. Allergic/Immunologic: Negative for immunocompromised state. Neurological:  Negative for dizziness, seizures, syncope, weakness, light-headedness, numbness and headaches.    Psychiatric/Behavioral:  Negative for behavioral problems, confusion,

## 2023-10-23 NOTE — ED TRIAGE NOTES
Pt presents ambulatory. States that last night she had a sore throat. This morning she woke up with bilateral ear soreness and a headache. States the sore throat has resolved.

## 2023-10-23 NOTE — ED NOTES
I have reviewed discharge instructions with the patient. The patient verbalized understanding.        Shelley Larsen RN  10/23/23 9663

## 2024-03-29 ENCOUNTER — OFFICE VISIT (OUTPATIENT)
Dept: PRIMARY CARE CLINIC | Facility: CLINIC | Age: 24
End: 2024-03-29
Payer: COMMERCIAL

## 2024-03-29 VITALS
OXYGEN SATURATION: 98 % | HEART RATE: 75 BPM | TEMPERATURE: 97.3 F | BODY MASS INDEX: 24.17 KG/M2 | WEIGHT: 154 LBS | HEIGHT: 67 IN | RESPIRATION RATE: 16 BRPM | SYSTOLIC BLOOD PRESSURE: 126 MMHG | DIASTOLIC BLOOD PRESSURE: 75 MMHG

## 2024-03-29 DIAGNOSIS — F41.9 ANXIETY AND DEPRESSION: ICD-10-CM

## 2024-03-29 DIAGNOSIS — Z00.00 WELL WOMAN EXAM (NO GYNECOLOGICAL EXAM): Primary | ICD-10-CM

## 2024-03-29 DIAGNOSIS — F32.A ANXIETY AND DEPRESSION: ICD-10-CM

## 2024-03-29 PROCEDURE — 99395 PREV VISIT EST AGE 18-39: CPT | Performed by: FAMILY MEDICINE

## 2024-03-29 RX ORDER — ESCITALOPRAM OXALATE 10 MG/1
10 TABLET ORAL DAILY
Qty: 90 TABLET | Refills: 3 | Status: SHIPPED | OUTPATIENT
Start: 2024-03-29

## 2024-03-29 ASSESSMENT — PATIENT HEALTH QUESTIONNAIRE - PHQ9
SUM OF ALL RESPONSES TO PHQ QUESTIONS 1-9: 0
2. FEELING DOWN, DEPRESSED OR HOPELESS: NOT AT ALL
1. LITTLE INTEREST OR PLEASURE IN DOING THINGS: NOT AT ALL
SUM OF ALL RESPONSES TO PHQ9 QUESTIONS 1 & 2: 0
SUM OF ALL RESPONSES TO PHQ QUESTIONS 1-9: 0

## 2024-03-29 NOTE — PROGRESS NOTES
HPI     Chief Complaint   Patient presents with    Annual Exam     She is a 23 y.o. female who presents to establish care.    Establishing Care    Pmhx : anxiety and depression    Stable on lexapro and would like to continue this.   Mood has been doing well on this.  Sleeping well.  Diet is healthy.   She exercises regularly, enjoys weight lifting.   She is sexually active. Uses condoms for contraception and prefers to continue with this plan. Was on ocp but stopped it because she did not like the way it changed her menses.     - Chronic medical problems:  Past Medical History:   Diagnosis Date    Anemia 10/7/2021    Anxiety     Chlamydia     Chronic back pain     Depression      - Current medications:   Current Outpatient Medications   Medication Sig    escitalopram (LEXAPRO) 10 MG tablet Take 1 tablet by mouth daily    drospirenone-ethinyl estradiol (LUPILLO) 3-0.02 MG per tablet Take 1 tablet by mouth daily     No current facility-administered medications for this visit.     - Family history:   Family History   Problem Relation Age of Onset    Cancer Maternal Grandfather         liver cancer    Heart Attack Paternal Grandfather     Heart Disease Paternal Grandfather     Breast Cancer Paternal Aunt     Elevated Lipids Father     Hypertension Father     No Known Problems Mother     Hypertension Maternal Grandmother     Diabetes Maternal Grandmother     Cancer Paternal Grandmother         unknown     - Allergies: No Known Allergies  - Surgical history:   Past Surgical History:   Procedure Laterality Date    WISDOM TOOTH EXTRACTION       - Social history (sexually active, occupation, smoker, etoh use, etc):   Social History     Socioeconomic History    Marital status: Single     Spouse name: Not on file    Number of children: Not on file    Years of education: Not on file    Highest education level: Not on file   Occupational History    Not on file   Tobacco Use    Smoking status: Never    Smokeless tobacco: Never

## 2024-03-29 NOTE — PROGRESS NOTES
\"Have you been to the ER, urgent care clinic since your last visit?  Hospitalized since your last visit?\"    NO    “Have you seen or consulted any other health care providers outside of [facility] since your last visit?”    NO            Click Here for Release of Records Request

## 2024-08-15 ENCOUNTER — HOSPITAL ENCOUNTER (EMERGENCY)
Facility: HOSPITAL | Age: 24
Discharge: HOME | End: 2024-08-15
Attending: EMERGENCY MEDICINE

## 2024-08-15 DIAGNOSIS — R55 SYNCOPE AND COLLAPSE: Primary | ICD-10-CM

## 2024-08-15 LAB
ALBUMIN SERPL-MCNC: 4.1 G/DL (ref 3.5–5)
ALBUMIN/GLOB SERPL: 1.1 (ref 1.1–2.2)
ALP SERPL-CCNC: 87 U/L (ref 45–117)
ALT SERPL-CCNC: 30 U/L (ref 12–78)
ANION GAP SERPL CALC-SCNC: 10 MMOL/L (ref 5–15)
APPEARANCE UR: CLEAR
AST SERPL-CCNC: 22 U/L (ref 15–37)
BACTERIA URNS QL MICRO: NEGATIVE /HPF
BASOPHILS # BLD: 0.1 K/UL (ref 0–0.1)
BASOPHILS NFR BLD: 1 % (ref 0–1)
BILIRUB SERPL-MCNC: 0.4 MG/DL (ref 0.2–1)
BILIRUB UR QL: NEGATIVE
BUN SERPL-MCNC: 15 MG/DL (ref 6–20)
BUN/CREAT SERPL: 17 (ref 12–20)
CALCIUM SERPL-MCNC: 8.9 MG/DL (ref 8.5–10.1)
CHLORIDE SERPL-SCNC: 103 MMOL/L (ref 97–108)
CO2 SERPL-SCNC: 29 MMOL/L (ref 21–32)
COLOR UR: NORMAL
CREAT SERPL-MCNC: 0.9 MG/DL (ref 0.55–1.02)
DIFFERENTIAL METHOD BLD: ABNORMAL
EOSINOPHIL # BLD: 0 K/UL (ref 0–0.4)
EOSINOPHIL NFR BLD: 0 % (ref 0–7)
EPITH CASTS URNS QL MICRO: NORMAL /LPF
ERYTHROCYTE [DISTWIDTH] IN BLOOD BY AUTOMATED COUNT: 12 % (ref 11.5–14.5)
GLOBULIN SER CALC-MCNC: 3.6 G/DL (ref 2–4)
GLUCOSE SERPL-MCNC: 93 MG/DL (ref 65–100)
GLUCOSE UR STRIP.AUTO-MCNC: NEGATIVE MG/DL
HCG UR QL: NEGATIVE
HCT VFR BLD AUTO: 39.1 % (ref 35–47)
HGB BLD-MCNC: 13 G/DL (ref 11.5–16)
HGB UR QL STRIP: NEGATIVE
IMM GRANULOCYTES # BLD AUTO: 0 K/UL (ref 0–0.04)
IMM GRANULOCYTES NFR BLD AUTO: 0 % (ref 0–0.5)
KETONES UR QL STRIP.AUTO: NEGATIVE MG/DL
LEUKOCYTE ESTERASE UR QL STRIP.AUTO: NEGATIVE
LYMPHOCYTES # BLD: 2 K/UL (ref 0.8–3.5)
LYMPHOCYTES NFR BLD: 15 % (ref 12–49)
MAGNESIUM SERPL-MCNC: 1.9 MG/DL (ref 1.6–2.4)
MCH RBC QN AUTO: 30.2 PG (ref 26–34)
MCHC RBC AUTO-ENTMCNC: 33.2 G/DL (ref 30–36.5)
MCV RBC AUTO: 90.7 FL (ref 80–99)
MONOCYTES # BLD: 0.9 K/UL (ref 0–1)
MONOCYTES NFR BLD: 7 % (ref 5–13)
NEUTS SEG # BLD: 10 K/UL (ref 1.8–8)
NEUTS SEG NFR BLD: 77 % (ref 32–75)
NITRITE UR QL STRIP.AUTO: NEGATIVE
NRBC # BLD: 0 K/UL (ref 0–0.01)
NRBC BLD-RTO: 0 PER 100 WBC
PH UR STRIP: 6 (ref 5–8)
PLATELET # BLD AUTO: 262 K/UL (ref 150–400)
PMV BLD AUTO: 11.3 FL (ref 8.9–12.9)
POTASSIUM SERPL-SCNC: 3.1 MMOL/L (ref 3.5–5.1)
PROT SERPL-MCNC: 7.7 G/DL (ref 6.4–8.2)
PROT UR STRIP-MCNC: NEGATIVE MG/DL
RBC # BLD AUTO: 4.31 M/UL (ref 3.8–5.2)
RBC #/AREA URNS HPF: NORMAL /HPF (ref 0–5)
SODIUM SERPL-SCNC: 142 MMOL/L (ref 136–145)
SP GR UR REFRACTOMETRY: <1.005 (ref 1–1.03)
TROPONIN I SERPL HS-MCNC: 6 NG/L (ref 0–51)
URINE CULTURE IF INDICATED: NORMAL
UROBILINOGEN UR QL STRIP.AUTO: 0.2 EU/DL (ref 0.2–1)
WBC # BLD AUTO: 12.9 K/UL (ref 3.6–11)
WBC URNS QL MICRO: NORMAL /HPF (ref 0–4)

## 2024-08-15 PROCEDURE — 81025 URINE PREGNANCY TEST: CPT

## 2024-08-15 PROCEDURE — 85025 COMPLETE CBC W/AUTO DIFF WBC: CPT

## 2024-08-15 PROCEDURE — 96360 HYDRATION IV INFUSION INIT: CPT

## 2024-08-15 PROCEDURE — 81001 URINALYSIS AUTO W/SCOPE: CPT

## 2024-08-15 PROCEDURE — 80053 COMPREHEN METABOLIC PANEL: CPT

## 2024-08-15 PROCEDURE — 84484 ASSAY OF TROPONIN QUANT: CPT

## 2024-08-15 PROCEDURE — 99284 EMERGENCY DEPT VISIT MOD MDM: CPT

## 2024-08-15 PROCEDURE — 36415 COLL VENOUS BLD VENIPUNCTURE: CPT

## 2024-08-15 PROCEDURE — 83735 ASSAY OF MAGNESIUM: CPT

## 2024-08-15 PROCEDURE — 2580000003 HC RX 258: Performed by: EMERGENCY MEDICINE

## 2024-08-15 RX ORDER — 0.9 % SODIUM CHLORIDE 0.9 %
1000 INTRAVENOUS SOLUTION INTRAVENOUS ONCE
Status: COMPLETED | OUTPATIENT
Start: 2024-08-15 | End: 2024-08-15

## 2024-08-15 RX ADMIN — SODIUM CHLORIDE 1000 ML: 9 INJECTION, SOLUTION INTRAVENOUS at 21:42

## 2024-08-15 ASSESSMENT — PAIN - FUNCTIONAL ASSESSMENT: PAIN_FUNCTIONAL_ASSESSMENT: NONE - DENIES PAIN

## 2024-08-16 VITALS
TEMPERATURE: 98 F | HEIGHT: 67 IN | OXYGEN SATURATION: 99 % | SYSTOLIC BLOOD PRESSURE: 124 MMHG | WEIGHT: 155.42 LBS | RESPIRATION RATE: 14 BRPM | DIASTOLIC BLOOD PRESSURE: 65 MMHG | BODY MASS INDEX: 24.39 KG/M2 | HEART RATE: 100 BPM

## 2024-08-16 NOTE — ED TRIAGE NOTES
Patient reports at dinner, felt dizziness at around 1907, had syncope and felt like it was coming on. Patient reports was about 10 sec, then became very diaphoretic. After the episode was able to eat, now reports feeling fine. Has Hx af fainting in the past due to anemia.  Reports feeling sometimes when stands up to quickly. Denies CP, SOB.

## 2024-08-18 LAB
EKG ATRIAL RATE: 81 BPM
EKG DIAGNOSIS: NORMAL
EKG P AXIS: 73 DEGREES
EKG P-R INTERVAL: 162 MS
EKG Q-T INTERVAL: 384 MS
EKG QRS DURATION: 112 MS
EKG QTC CALCULATION (BAZETT): 446 MS
EKG R AXIS: 90 DEGREES
EKG T AXIS: 59 DEGREES
EKG VENTRICULAR RATE: 81 BPM

## 2024-08-20 NOTE — ED PROVIDER NOTES
of Exercise per Week: 7 days    • Minutes of Exercise per Session: 90 min   Intimate Partner Violence: Unknown (7/11/2022)    Humiliation, Afraid, Rape, and Kick questionnaire    • Fear of Current or Ex-Partner: Patient declined    • Emotionally Abused: Patient declined    • Physically Abused: Patient declined    • Sexually Abused: Patient declined         PHYSICAL EXAM       ED Triage Vitals [08/15/24 2129]   BP Systolic BP Percentile Diastolic BP Percentile Temp Temp Source Pulse Respirations SpO2   (!) 148/93 -- -- 98 °F (36.7 °C) Oral 100 14 100 %      Height Weight - Scale         1.702 m (5' 7\") 70.5 kg (155 lb 6.8 oz)             Body mass index is 24.34 kg/m².    Physical Exam        EMERGENCY DEPARTMENT COURSE and DIFFERENTIAL DIAGNOSIS/MDM:   Vitals:    Vitals:    08/15/24 2129   BP: (!) 148/93   Pulse: 100   Resp: 14   Temp: 98 °F (36.7 °C)   TempSrc: Oral   SpO2: 100%   Weight: 70.5 kg (155 lb 6.8 oz)   Height: 1.702 m (5' 7\")         Medical Decision Making  Amount and/or Complexity of Data Reviewed  Labs: ordered.  ECG/medicine tests: ordered.    Risk  Prescription drug management.            REASSESSMENT     ED Course as of 08/15/24 2156   Thu Aug 15, 2024   2145 9:36 PM EKG shows normal sinus rhythm with a rate of 81 bpm with no acute ST or T wave abnormalities suggestive of ischemia. [WJ]      ED Course User Index  [WJ] Ezekiel Fong DO         CONSULTS:  None    PROCEDURES:     Procedures      ***      (Please note that portions of this note were completed with a voice recognition program.  Efforts were made to edit the dictations but occasionally words are mis-transcribed.)    Ezekiel Fong DO (electronically signed)  Emergency Attending Physician           
the bedside and they stated both understanding and agreement.      REASSESSMENT     ED Course as of 08/20/24 1235   Thu Aug 15, 2024   2145 9:36 PM EKG shows normal sinus rhythm with a rate of 81 bpm with no acute ST or T wave abnormalities suggestive of ischemia. [WJ]      ED Course User Index  [WJ] Ezekiel Fong DO         CONSULTS:  None    PROCEDURES:     Procedures            (Please note that portions of this note were completed with a voice recognition program.  Efforts were made to edit the dictations but occasionally words are mis-transcribed.)    Ezekiel Fong DO (electronically signed)  Emergency Attending Physician              Ezekiel Fong DO  08/20/24 1238

## 2024-10-08 ENCOUNTER — OFFICE VISIT (OUTPATIENT)
Dept: PRIMARY CARE CLINIC | Facility: CLINIC | Age: 24
End: 2024-10-08
Payer: COMMERCIAL

## 2024-10-08 VITALS
RESPIRATION RATE: 16 BRPM | BODY MASS INDEX: 23.67 KG/M2 | SYSTOLIC BLOOD PRESSURE: 128 MMHG | HEIGHT: 67 IN | WEIGHT: 150.8 LBS | DIASTOLIC BLOOD PRESSURE: 87 MMHG | TEMPERATURE: 97.3 F | OXYGEN SATURATION: 98 % | HEART RATE: 81 BPM

## 2024-10-08 DIAGNOSIS — J02.9 SORE THROAT: Primary | ICD-10-CM

## 2024-10-08 PROCEDURE — 99213 OFFICE O/P EST LOW 20 MIN: CPT | Performed by: FAMILY MEDICINE

## 2024-10-08 RX ORDER — AMOXICILLIN 500 MG/1
500 CAPSULE ORAL 2 TIMES DAILY
Qty: 14 CAPSULE | Refills: 0 | Status: SHIPPED | OUTPATIENT
Start: 2024-10-11 | End: 2024-10-18

## 2024-10-08 SDOH — ECONOMIC STABILITY: FOOD INSECURITY: WITHIN THE PAST 12 MONTHS, YOU WORRIED THAT YOUR FOOD WOULD RUN OUT BEFORE YOU GOT MONEY TO BUY MORE.: NEVER TRUE

## 2024-10-08 SDOH — ECONOMIC STABILITY: FOOD INSECURITY: WITHIN THE PAST 12 MONTHS, THE FOOD YOU BOUGHT JUST DIDN'T LAST AND YOU DIDN'T HAVE MONEY TO GET MORE.: NEVER TRUE

## 2024-10-08 SDOH — ECONOMIC STABILITY: INCOME INSECURITY: HOW HARD IS IT FOR YOU TO PAY FOR THE VERY BASICS LIKE FOOD, HOUSING, MEDICAL CARE, AND HEATING?: NOT HARD AT ALL

## 2024-10-08 ASSESSMENT — PATIENT HEALTH QUESTIONNAIRE - PHQ9
SUM OF ALL RESPONSES TO PHQ QUESTIONS 1-9: 0
SUM OF ALL RESPONSES TO PHQ QUESTIONS 1-9: 0
2. FEELING DOWN, DEPRESSED OR HOPELESS: NOT AT ALL
1. LITTLE INTEREST OR PLEASURE IN DOING THINGS: NOT AT ALL
SUM OF ALL RESPONSES TO PHQ QUESTIONS 1-9: 0
SUM OF ALL RESPONSES TO PHQ QUESTIONS 1-9: 0
SUM OF ALL RESPONSES TO PHQ9 QUESTIONS 1 & 2: 0

## 2024-10-08 NOTE — PROGRESS NOTES
\"Have you been to the ER, urgent care clinic since your last visit?  Hospitalized since your last visit?\"    NO    “Have you seen or consulted any other health care providers outside of [facility] since your last visit?”    NO            Click Here for Release of Records Request   
Diagnosis Orders   1. Sore throat  AMB POC RAPID STREP A    AMB POC SARS-COV-2        Consistent with viral upper respiratory infection.  Discussed expected course and care recommendations    Given prescription for delayed antibiotic.  Advised to take this if not improved within the next several days.  Seek care if her condition worsens in any way or new features develop.  No follow-up provider specified.      I have discussed the diagnosis with the patient and the intended plan as seen in the above orders. Patient verbalized understanding of the plan and agrees with the plan. I have discussed medication side effects and warnings with the patient as well. Informed patient to return to the office if new symptoms arise.        Tiffany Ceron, DO